# Patient Record
Sex: FEMALE | Race: WHITE | Employment: OTHER | URBAN - METROPOLITAN AREA
[De-identification: names, ages, dates, MRNs, and addresses within clinical notes are randomized per-mention and may not be internally consistent; named-entity substitution may affect disease eponyms.]

---

## 2017-01-04 ENCOUNTER — TELEPHONE (OUTPATIENT)
Dept: INTERNAL MEDICINE CLINIC | Age: 72
End: 2017-01-04

## 2017-01-04 RX ORDER — ZOLPIDEM TARTRATE 5 MG/1
5 TABLET ORAL
Qty: 10 TAB | Refills: 0 | OUTPATIENT
Start: 2017-01-04 | End: 2017-07-28 | Stop reason: SDUPTHER

## 2017-01-04 NOTE — TELEPHONE ENCOUNTER
Patient is requesting script for [facility] for her and her  because they are traveling 18 hours to [facility].  Another contact [phone]

## 2017-02-23 ENCOUNTER — OFFICE VISIT (OUTPATIENT)
Dept: INTERNAL MEDICINE CLINIC | Age: 72
End: 2017-02-23

## 2017-02-23 VITALS
BODY MASS INDEX: 25.69 KG/M2 | HEIGHT: 63 IN | OXYGEN SATURATION: 98 % | SYSTOLIC BLOOD PRESSURE: 94 MMHG | HEART RATE: 65 BPM | WEIGHT: 145 LBS | RESPIRATION RATE: 16 BRPM | DIASTOLIC BLOOD PRESSURE: 62 MMHG | TEMPERATURE: 98.7 F

## 2017-02-23 DIAGNOSIS — Z00.00 ROUTINE GENERAL MEDICAL EXAMINATION AT A HEALTH CARE FACILITY: Primary | ICD-10-CM

## 2017-02-23 DIAGNOSIS — E78.00 ELEVATED CHOLESTEROL: ICD-10-CM

## 2017-02-23 DIAGNOSIS — Z11.59 NEED FOR HEPATITIS C SCREENING TEST: ICD-10-CM

## 2017-02-23 DIAGNOSIS — E55.9 VITAMIN D DEFICIENCY: ICD-10-CM

## 2017-02-23 DIAGNOSIS — Z71.89 ADVANCE DIRECTIVE DISCUSSED WITH PATIENT: ICD-10-CM

## 2017-02-23 DIAGNOSIS — Z13.39 SCREENING FOR ALCOHOLISM: ICD-10-CM

## 2017-02-23 RX ORDER — DICYCLOMINE HYDROCHLORIDE 10 MG/1
CAPSULE ORAL
Refills: 3 | COMMUNITY
Start: 2016-12-19 | End: 2018-09-24 | Stop reason: SDUPTHER

## 2017-02-23 RX ORDER — NAPROXEN SODIUM 220 MG
220 TABLET ORAL 2 TIMES DAILY WITH MEALS
COMMUNITY
End: 2020-02-20

## 2017-02-23 NOTE — MR AVS SNAPSHOT
Visit Information Date & Time Provider Department Dept. Phone Encounter #  
 2/23/2017  9:20 Ivanna Martinez MD [facility] [phone] [id number] Upcoming Health Maintenance Date Due Hepatitis C Screening 1945 BREAST CANCER SCRN MAMMOGRAM 2/18/1964 COLONOSCOPY 2/18/1964 ZOSTER VACCINE AGE 60> 2/18/2006 GLAUCOMA SCREENING Q2Y 2/18/2011 Pneumococcal 65+ Low/Medium Risk (1 of 2 - PCV13) 2/18/2011 MEDICARE YEARLY EXAM 11/16/2013 DTaP/Tdap/Td series (2 - Td) 11/15/2022 Allergies as of 2/23/2017  Review Complete On: 2/23/2017 By: Gabe Willoughby MD  
  
 Severity Noted Reaction Type Reactions Darvocet A500 [Propoxyphene N-acetaminophen]  11/15/2012   Side Effect Other (comments)  
 headache Pcn [Penicillins]  11/15/2012   Systemic Hives Percocet [Oxycodone-acetaminophen]  11/15/2012   Systemic Other (comments) Headache - severe Poison Ivy [Vit E-nonoxynol 9-aloe Vera]  11/15/2012   Systemic Rash Severe allergy Current Immunizations  Reviewed on 11/14/2016 Name Date Influenza High Dose Vaccine PF 11/9/2016 Influenza Vaccine Split 10/15/2012 TDAP Vaccine 11/15/2012 Not reviewed this visit You Were Diagnosed With   
  
 Codes Comments Elevated cholesterol    -  Primary ICD-10-CM: E78.00 ICD-9-CM: 272.0 Routine general medical examination at a health care facility     ICD-10-CM: Z00.00 ICD-9-CM: V70.0 Screening for alcoholism     ICD-10-CM: Z13.89 ICD-9-CM: V79.1 Vitamin D deficiency     ICD-10-CM: E55.9 ICD-9-CM: 268.9 Need for hepatitis C screening test     ICD-10-CM: Z11.59 
ICD-9-CM: V73.89 Vitals BP  
  
  
  
  
  
 94/62 (BP 1 Location: Left arm, BP Patient Position: Sitting) BMI and BSA Data Body Mass Index Body Surface Area  
 25.89 kg/m 2 1.71 m 2 Preferred Pharmacy Pharmacy Name Phone [id number], [address] [phone] Your Updated Medication List  
  
   
This list is accurate as of: 2/23/17 10:31 AM.  Always use your most recent med list.  
  
  
  
  
 alendronate 70 mg tablet Commonly known as:  FOSAMAX ALEVE 220 mg tablet Generic drug:  naproxen sodium Take 220 mg by mouth two (2) times daily (with meals). calcium-cholecalciferol (d3) 600-125 mg-unit Tab Take  by mouth. CLARITIN 10 mg tablet Generic drug:  loratadine Take 1 Tab by mouth daily. dicyclomine 10 mg capsule Commonly known as:  BENTYL TAKE ONE CAPSULE BY MOUTH 3 TIMES A DAY AS NEEDED ABDOMINAL PAIN OR DIARRHEA  
  
 FISH OIL 1,000 mg Cap Generic drug:  omega-3 fatty acids-vitamin e Take 1 Cap by mouth. Magnesium Oxide 500 mg Cap Take  by mouth.  
  
 multivitamin tablet Commonly known as:  ONE A DAY Take 1 Tab by mouth daily. VITAMIN D3 1,000 unit Cap Generic drug:  cholecalciferol Take 1 Cap by mouth daily. zolpidem 5 mg tablet Commonly known as:  AMBIEN Take 1 Tab by mouth nightly as needed for Sleep. Max Daily Amount: 5 mg. We Performed the Following HEPATITIS C AB [62761 CPT(R)] LIPID PANEL [09108 CPT(R)] METABOLIC PANEL, COMPREHENSIVE [03555 CPT(R)] VITAMIN D, 25 HYDROXY [id number] CPT(R)] Patient Instructions Q-gohart Activation Thank you for requesting access to [employer]. Please follow the instructions below to securely access and download your online medical record. [employer] allows you to send messages to your doctor, view your test results, renew your prescriptions, schedule appointments, and more. How Do I Sign Up? 1. In your internet browser, go to [email or web address] 
2. Click on the First Time User? Click Here link in the Sign In box. You will be redirect to the New Member Sign Up page. 3. Enter your Imagga Access Code exactly as it appears below. You will not need to use this code after youve completed the sign-up process. If you do not sign up before the expiration date, you must request a new code. Imagga Access Code: [email or web address] Expires: 2017  9:22 AM (This is the date your Imagga access code will ) 4. Enter the last four digits of your Social Security Number (xxxx) and Date of Birth (mm/dd/yyyy) as indicated and click Submit. You will be taken to the next sign-up page. 5. Create a Cloud Takeofft ID. This will be your Imagga login ID and cannot be changed, so think of one that is secure and easy to remember. 6. Create a Imagga password. You can change your password at any time. 7. Enter your Password Reset Question and Answer. This can be used at a later time if you forget your password. 8. Enter your e-mail address. You will receive e-mail notification when new information is available in [address] 9. Click Sign Up. You can now view and download portions of your medical record. 10. Click the Download Summary menu link to download a portable copy of your medical information. Additional Information If you have questions, please visit the Frequently Asked Questions section of the Imagga website at [email or web address] Remember, Imagga is NOT to be used for urgent needs. For medical emergencies, dial 911. Medicare Wellness Visit, Female The best way to live healthy is to have a healthy lifestyle by eating a well-balanced diet, exercising regularly, limiting alcohol and stopping smoking. Regular physical exams and screening tests are another way to keep healthy. Preventive exams provided by your health care provider can find health problems before they become diseases or illnesses. Preventive services including immunizations, screening tests, monitoring and exams can help you take care of your own health. All people over age 72 should have a pneumovax  and and a prevnar shot to prevent pneumonia. These are once in a lifetime unless you and your provider decide differently. All people over 65 should have a yearly flu shot and a tetanus vaccine every 10 years. A bone mass density to screen for osteoporosis or thinning of the bones should be done every 2 years after 65. Screening for diabetes mellitus with a blood sugar test should be done every year. Glaucoma is a disease of the eye due to increased ocular pressure that can lead to blindness and it should be done every year by an eye professional. 
 
Cardiovascular screening tests that check for elevated lipids (fatty part of blood) which can lead to heart disease and strokes should be done every 5 years. Colorectal screening that evaluates for blood or polyps in your colon should be done yearly as a stool test or every five years as a flexible sigmoidoscope or every 10 years as a colonoscopy up to age 76. Breast cancer screening with a mammogram is recommended biennially  for women age 54-69. Screening for cervical cancer with a pap smear and pelvic exam is recommended for women after age 72 years every 2 years up to age 79 or when the provider and patient decide to stop. If there is a history of cervical abnormalities or other increased risk for cancer then the test is recommended yearly. Hepatitis C screening is also recommended for anyone born between 80 through [address]. A shingles vaccine is also recommended once in a lifetime after age 61. Your Medicare Wellness Exam is recommended annually. Here is a list of your current Health Maintenance items with a due date: 
Health Maintenance Due Topic Date Due  
 Hepatitis C Test  Ordered today  Breast Cancer Screening  Summer 2017  Colonoscopy  2020  Shingles Vaccine  Completed  Glaucoma Screening   Up to date  Pneumococcal Vaccine (1 of 2 - PCV13) Completed [facility] Annual Well Visit  2/23/2018 For ear wax: Debrox Ear Drops For cold every winter:  
Start with Mucinex 1200mg twice a day (plain not D or DM) Netti Pot once a day. Introducing [facility]! [facility] introduces MyStream patient portal. Now you can access parts of your medical record, email your doctor's office, and request medication refills online. 1. In your internet browser, go to [email or web address] 2. Click on the First Time User? Click Here link in the Sign In box. You will see the New Member Sign Up page. 3. Enter your MyStream Access Code exactly as it appears below. You will not need to use this code after youve completed the sign-up process. If you do not sign up before the expiration date, you must request a new code. · MyStream Access Code: [email or web address] Expires: 5/24/2017  9:22 AM 
 
4. Enter the last four digits of your Social Security Number (xxxx) and Date of Birth (mm/dd/yyyy) as indicated and click Submit. You will be taken to the next sign-up page. 5. Create a MyStream ID. This will be your MyStream login ID and cannot be changed, so think of one that is secure and easy to remember. 6. Create a MyStream password. You can change your password at any time. 7. Enter your Password Reset Question and Answer. This can be used at a later time if you forget your password. 8. Enter your e-mail address. You will receive e-mail notification when new information is available in [address] 9. Click Sign Up. You can now view and download portions of your medical record. 10. Click the Download Summary menu link to download a portable copy of your medical information. If you have questions, please visit the Frequently Asked Questions section of the MyStream website. Remember, MyStream is NOT to be used for urgent needs. For medical emergencies, dial 911. Now available from your iPhone and Android! Please provide this summary of care documentation to your next provider. Your primary care clinician is listed as CARLITO FULLER. If you have any questions after today's visit, please call [phone].

## 2017-02-23 NOTE — PATIENT INSTRUCTIONS
Daily Sales Exchange Activation    Thank you for requesting access to Daily Sales Exchange. Please follow the instructions below to securely access and download your online medical record. Daily Sales Exchange allows you to send messages to your doctor, view your test results, renew your prescriptions, schedule appointments, and more. How Do I Sign Up? 1. In your internet browser, go to [email or web address]  2. Click on the First Time User? Click Here link in the Sign In box. You will be redirect to the New Member Sign Up page. 3. Enter your Daily Sales Exchange Access Code exactly as it appears below. You will not need to use this code after youve completed the sign-up process. If you do not sign up before the expiration date, you must request a new code. Daily Sales Exchange Access Code: [email or web address]  Expires: 2017  9:22 AM (This is the date your Daily Sales Exchange access code will )    4. Enter the last four digits of your Social Security Number (xxxx) and Date of Birth (mm/dd/yyyy) as indicated and click Submit. You will be taken to the next sign-up page. 5. Create a Daily Sales Exchange ID. This will be your Daily Sales Exchange login ID and cannot be changed, so think of one that is secure and easy to remember. 6. Create a Daily Sales Exchange password. You can change your password at any time. 7. Enter your Password Reset Question and Answer. This can be used at a later time if you forget your password. 8. Enter your e-mail address. You will receive e-mail notification when new information is available in [address] 9. Click Sign Up. You can now view and download portions of your medical record. 10. Click the Download Summary menu link to download a portable copy of your medical information. Additional Information    If you have questions, please visit the Frequently Asked Questions section of the Daily Sales Exchange website at [email or web address] Remember, Daily Sales Exchange is NOT to be used for urgent needs. For medical emergencies, dial 911.         Medicare Wellness Visit, Female    The best way to live healthy is to have a healthy lifestyle by eating a well-balanced diet, exercising regularly, limiting alcohol and stopping smoking. Regular physical exams and screening tests are another way to keep healthy. Preventive exams provided by your health care provider can find health problems before they become diseases or illnesses. Preventive services including immunizations, screening tests, monitoring and exams can help you take care of your own health. All people over age 72 should have a pneumovax  and and a prevnar shot to prevent pneumonia. These are once in a lifetime unless you and your provider decide differently. All people over 65 should have a yearly flu shot and a tetanus vaccine every 10 years. A bone mass density to screen for osteoporosis or thinning of the bones should be done every 2 years after 65. Screening for diabetes mellitus with a blood sugar test should be done every year. Glaucoma is a disease of the eye due to increased ocular pressure that can lead to blindness and it should be done every year by an eye professional.    Cardiovascular screening tests that check for elevated lipids (fatty part of blood) which can lead to heart disease and strokes should be done every 5 years. Colorectal screening that evaluates for blood or polyps in your colon should be done yearly as a stool test or every five years as a flexible sigmoidoscope or every 10 years as a colonoscopy up to age 76. Breast cancer screening with a mammogram is recommended biennially  for women age 54-69. Screening for cervical cancer with a pap smear and pelvic exam is recommended for women after age 72 years every 2 years up to age 79 or when the provider and patient decide to stop. If there is a history of cervical abnormalities or other increased risk for cancer then the test is recommended yearly.     Hepatitis C screening is also recommended for anyone born between [birth year] through 1965. A shingles vaccine is also recommended once in a lifetime after age 61. Your Medicare Wellness Exam is recommended annually. Here is a list of your current Health Maintenance items with a due date:  Health Maintenance Due   Topic Date Due    Hepatitis C Test  Ordered today    Breast Cancer Screening  Summer 2017    Colonoscopy  2020    Shingles Vaccine  Completed    Glaucoma Screening   Up to date    Pneumococcal Vaccine (1 of 2 - PCV13) Completed    Annual Well Visit  2/23/2018       For ear wax: Debrox Ear Drops    For cold every winter:   Start with Mucinex 1200mg twice a day (plain not D or DM)  Netti Pot once a day.

## 2017-02-23 NOTE — PROGRESS NOTES
This is an Initial Medicare Annual Wellness Exam (AWV) (Performed 12 months after IPPE or effective date of Medicare Part B enrollment, Once in a lifetime)    I have reviewed the patient's medical history in detail and updated the computerized patient record. Has not been taking her fosamax regularly. Forgets to take. Had DXA 7/2015, showed T score right hip -2.7. STarted on Fosamax at that time. Right hip busitis. Had area injected by Dr. Liliana Hernandez in the past.  [facility] a lot and plays golf. Bowels have improved. Has dycyclomine as needed only. Went to [facility] and [place] without a flare. Feels energy has improved with resolution of diarrhea. Migraines have been more frequent but not as severe. Can have aura without headache. Bright light is trigger. No episodes of vertigo in the last year. URI and left ear cerumen impaction can trigger. Sees Thanh Dupont regularly. Has beginnings of cataracts that they are following. History     Past Medical History:   Diagnosis Date    Colon polyps 1996    Esophageal spasm     Migraine     Vertigo       Past Surgical History:   Procedure Laterality Date    ENDOSCOPY, COLON, DIAGNOSTIC  2012    negative; 4 th one    HX ORTHOPAEDIC Right 2001    r knee ACL, removal of loose body    HX ORTHOPAEDIC Right 1995    R wrist surgery after carpal fractures     HX ORTHOPAEDIC Right 9-2014    rotator cuff repair/ reattached biceps with screw     Current Outpatient Prescriptions   Medication Sig Dispense Refill    dicyclomine (BENTYL) 10 mg capsule TAKE ONE CAPSULE BY MOUTH 3 TIMES A DAY AS NEEDED ABDOMINAL PAIN OR DIARRHEA  3    naproxen sodium (ALEVE) 220 mg tablet Take 220 mg by mouth two (2) times daily (with meals).  Magnesium Oxide 500 mg cap Take  by mouth.  calcium-cholecalciferol, d3, 600-125 mg-unit tab Take  by mouth.  Cholecalciferol, Vitamin D3, (VITAMIN D3) 1,000 unit cap Take 1 Cap by mouth daily.       omega-3 fatty acids-vitamin e (FISH OIL) 1,000 mg cap Take 1 Cap by mouth.  loratadine (CLARITIN) 10 mg tablet Take 1 Tab by mouth daily. 30 Tab 3    multivitamin (ONE A DAY) tablet Take 1 Tab by mouth daily.  zolpidem (AMBIEN) 5 mg tablet Take 1 Tab by mouth nightly as needed for Sleep. Max Daily Amount: 5 mg. 10 Tab 0    alendronate (FOSAMAX) 70 mg tablet   2     Allergies   Allergen Reactions    Darvocet A500 [Propoxyphene N-Acetaminophen] Other (comments)     headache    Pcn [Penicillins] Hives    Percocet [Oxycodone-Acetaminophen] Other (comments)     Headache - severe    Poison Ivy [Vit E-Nonoxynol 9-Aloe Vera] Rash     Severe allergy     Family History   Problem Relation Age of Onset    Hypertension Mother     Hypertension Father     MS Daughter     Other Brother      celiac sprue     Social History   Substance Use Topics    Smoking status: Never Smoker    Smokeless tobacco: Never Used    Alcohol use 4.2 oz/week     7 Standard drinks or equivalent per week     Patient Active Problem List   Diagnosis Code    Allergic dermatitis due to poison ivy L23.7    Vertigo, benign paroxysmal H81.10    Migraine G43.909    Hx of basal cell carcinoma Z85.828    History of colon polyps Z86.010    Esophageal spasm K22.4         Depression Risk Factor Screening:     PHQ 2 / 9, over the last two weeks 2/29/2016   Little interest or pleasure in doing things Not at all   Feeling down, depressed or hopeless Not at all   Total Score PHQ 2 0     Alcohol Risk Factor Screening: On any occasion during the past 3 months, have you had more than 3 drinks containing alcohol? No    Do you average more than 7 drinks per week? No    Functional Ability and Level of Safety:     Hearing Loss   none    Activities of Daily Living   Self-care. Requires assistance with: no ADLs    Fall Risk     Fall Risk Assessment, last 12 mths 2/29/2016   Able to walk? Yes   Fall in past 12 months? Yes   Fall with injury?  Yes   Number of falls in past 12 months 1   Fall Risk Score 2     Abuse Screen   Patient is not abused    Review of Systems   Constitutional: negative except for weight is up. Eyes: negative  Ears, nose, mouth, throat, and face: negative except for frontal sinus congestion with post nasal drainage  Respiratory: negative  Cardiovascular: negative  Gastrointestinal: negative except for esophageal spasms  Genitourinary:negative  Integument/breast: negative  Hematologic/lymphatic: negative except for skin is thinner on arms with easier bruising  Musculoskeletal:negative except for arthralgias and stiff joints  Neurological: negative except for migraines as above  Behavioral/Psych: negative except for not a good sleeper, still wtih some night sweats  Endocrine: negative  Allergic/Immunologic: negative    Physical Examination     No exam data present    Evaluation of Cognitive Function:  Mood/affect:  happy  Appearance: age appropriate  Family member/caregiver input: none    Visit Vitals    BP 94/62 (BP 1 Location: Left arm, BP Patient Position: Sitting)    Pulse 65    Temp 98.7 °F (37.1 °C) (Oral)    Resp 16    Ht 5' 2.75\" (1.594 m)    Wt 145 lb (65.8 kg)    SpO2 98%    BMI 25.89 kg/m2     General appearance: alert, cooperative, no distress, appears stated age  Head: Normocephalic, without obvious abnormality, atraumatic  Eyes: conjunctivae/corneas clear. PERRL, EOM's intact. Fundi benign  Ears: normal TM's and external ear canals AU  Nose: Nares normal. Septum midline. Mucosa normal. No drainage or sinus tenderness. Throat: Lips, mucosa, and tongue normal. Teeth and gums normal  Neck: supple, symmetrical, trachea midline, no adenopathy, thyroid: not enlarged, symmetric, no tenderness/mass/nodules, no carotid bruit and no JVD  Back: symmetric, no curvature. ROM normal. No CVA tenderness.   Lungs: clear to auscultation bilaterally  Breasts: normal appearance, no masses or tenderness  Heart: regular rate and rhythm, S1, S2 normal, no murmur, click, rub or gallop  Abdomen: soft, non-tender. Bowel sounds normal. No masses,  no organomegaly  Extremities: extremities normal, atraumatic, no cyanosis or edema  Pulses: 2+ and symmetric  Skin: Skin color, texture, turgor normal. No rashes or lesions  Lymph nodes: Cervical, supraclavicular, and axillary nodes normal.  Neurologic: Grossly normal    Patient Care Team:  Ade Moody MD as PCP - General (Internal Medicine)  Parrish Tamayo MD (Obstetrics & Gynecology)  Shadi Lovell MD (Ophthalmology)  Oracio Monsivais MD (Dermatology)  Yandy Saenz MD (Gastroenterology)    Advice/Referrals/Counseling   Education and counseling provided:  has an advanced directive - encouraged to bring in copy    Assessment/Plan   80yo female  HM - hep C screening, werner locate immunizations and eye exams  Vertigo = controlled  Migraine headaches - controlled, cont same  Borderline cholestrerol in past - check labs  Osteopenia - will restart fosamax, repeat DXA this summer. Hx of low vit D level, will repeat  IBS - improved,  Dicyclomine prn  Orders Placed This Encounter    METABOLIC PANEL, COMPREHENSIVE    LIPID PANEL    VITAMIN D, 25 HYDROXY    HEPATITIS C AB    dicyclomine (BENTYL) 10 mg capsule    naproxen sodium (ALEVE) 220 mg tablet     Follow-up Disposition: Not on File.

## 2017-03-08 ENCOUNTER — HOSPITAL ENCOUNTER (OUTPATIENT)
Dept: LAB | Age: 72
Discharge: HOME | End: 2017-03-08
Payer: MEDICARE

## 2017-03-08 PROCEDURE — 82306 VITAMIN D 25 HYDROXY: CPT

## 2017-03-08 PROCEDURE — 36415 COLL VENOUS BLD VENIPUNCTURE: CPT

## 2017-03-08 PROCEDURE — 80053 COMPREHEN METABOLIC PANEL: CPT

## 2017-03-08 PROCEDURE — 80061 LIPID PANEL: CPT

## 2017-03-08 PROCEDURE — 86803 HEPATITIS C AB TEST: CPT

## 2017-03-09 LAB
25(OH)D3+25(OH)D2 SERPL-MCNC: 40.6 NG/ML (ref 30–100)
ALBUMIN SERPL-MCNC: 4.4 G/DL (ref 3.5–4.8)
ALBUMIN/GLOB SERPL: 2 {RATIO} (ref 1.1–2.5)
ALP SERPL-CCNC: 43 IU/L (ref 39–117)
ALT SERPL-CCNC: 18 IU/L (ref 0–32)
AST SERPL-CCNC: 24 IU/L (ref 0–40)
BILIRUB SERPL-MCNC: 0.4 MG/DL (ref 0–1.2)
BUN SERPL-MCNC: 23 MG/DL (ref 8–27)
BUN/CREAT SERPL: 35 (ref 11–26)
CALCIUM SERPL-MCNC: 9.2 MG/DL (ref 8.7–10.3)
CHLORIDE SERPL-SCNC: 102 MMOL/L (ref 96–106)
CHOLEST SERPL-MCNC: 205 MG/DL (ref 100–199)
CO2 SERPL-SCNC: 24 MMOL/L (ref 18–29)
CREAT SERPL-MCNC: 0.65 MG/DL (ref 0.57–1)
GLOBULIN SER CALC-MCNC: 2.2 G/DL (ref 1.5–4.5)
GLUCOSE SERPL-MCNC: 85 MG/DL (ref 65–99)
HCV AB S/CO SERPL IA: 0.1 S/CO RATIO (ref 0–0.9)
HDLC SERPL-MCNC: 67 MG/DL
INTERPRETATION, 910389: NORMAL
LDLC SERPL CALC-MCNC: 123 MG/DL (ref 0–99)
POTASSIUM SERPL-SCNC: 4.6 MMOL/L (ref 3.5–5.2)
PROT SERPL-MCNC: 6.6 G/DL (ref 6–8.5)
SODIUM SERPL-SCNC: 142 MMOL/L (ref 134–144)
TRIGL SERPL-MCNC: 76 MG/DL (ref 0–149)
VLDLC SERPL CALC-MCNC: 15 MG/DL (ref 5–40)

## 2017-05-30 RX ORDER — ALENDRONATE SODIUM 70 MG/1
TABLET ORAL
Qty: 4 TAB | Refills: 11 | Status: SHIPPED | OUTPATIENT
Start: 2017-05-30 | End: 2018-06-19 | Stop reason: SDUPTHER

## 2017-07-28 ENCOUNTER — TELEPHONE (OUTPATIENT)
Dept: INTERNAL MEDICINE CLINIC | Age: 72
End: 2017-07-28

## 2017-12-27 ENCOUNTER — HOSPITAL ENCOUNTER (OUTPATIENT)
Dept: LAB | Age: 72
Discharge: HOME | End: 2017-12-27

## 2018-02-23 ENCOUNTER — HOSPITAL ENCOUNTER (OUTPATIENT)
Dept: LAB | Age: 73
Discharge: HOME | End: 2018-02-23
Payer: MEDICARE

## 2018-02-23 ENCOUNTER — OFFICE VISIT (OUTPATIENT)
Dept: INTERNAL MEDICINE CLINIC | Age: 73
End: 2018-02-23

## 2018-02-23 VITALS
HEART RATE: 65 BPM | TEMPERATURE: 97.8 F | HEIGHT: 63 IN | SYSTOLIC BLOOD PRESSURE: 120 MMHG | DIASTOLIC BLOOD PRESSURE: 68 MMHG | OXYGEN SATURATION: 96 % | WEIGHT: 146.2 LBS | BODY MASS INDEX: 25.91 KG/M2

## 2018-02-23 DIAGNOSIS — Z00.00 MEDICARE ANNUAL WELLNESS VISIT, SUBSEQUENT: Primary | ICD-10-CM

## 2018-02-23 DIAGNOSIS — H61.23 BILATERAL IMPACTED CERUMEN: ICD-10-CM

## 2018-02-23 DIAGNOSIS — Z71.89 ADVANCE DIRECTIVE DISCUSSED WITH PATIENT: ICD-10-CM

## 2018-02-23 PROCEDURE — 82306 VITAMIN D 25 HYDROXY: CPT

## 2018-02-23 PROCEDURE — 80061 LIPID PANEL: CPT

## 2018-02-23 PROCEDURE — 80053 COMPREHEN METABOLIC PANEL: CPT

## 2018-02-23 PROCEDURE — 36415 COLL VENOUS BLD VENIPUNCTURE: CPT

## 2018-02-23 NOTE — PATIENT INSTRUCTIONS

## 2018-02-23 NOTE — MR AVS SNAPSHOT
[address] [place] [address] [address] 350 [facility] 
[phone] Patient: Abigail Leigh MRN: [id number] UFN:[id number] Visit Information Date & Time Provider Department Dept. Phone Encounter #  
 2/23/2018  9:20 AM Amanda Ellison MD [facility] [phone] [id number] Your Appointments 4/2/2018  9:20 AM  
COMPLETE 40 with Amanda Ellison MD  
[facility] [address]) Appt Note: CPE, su 12.18.17  
 [place] [address]254 [facility] 121 [facility] 7 [phone] Upcoming Health Maintenance Date Due ZOSTER VACCINE AGE 60> 12/18/2005 Pneumococcal 65+ Low/Medium Risk (1 of 2 - PCV13) 2/18/2011 BREAST CANCER SCRN MAMMOGRAM 3/3/2016 COLONOSCOPY 4/27/2018 MEDICARE YEARLY EXAM 2/24/2018 GLAUCOMA SCREENING Q2Y 7/7/2018 DTaP/Tdap/Td series (2 - Td) 11/15/2022 Allergies as of 2/23/2018  Review Complete On: 2/23/2018 By: Amanda Ellison MD  
  
 Severity Noted Reaction Type Reactions Darvocet A500 [Propoxyphene N-acetaminophen]  11/15/2012   Side Effect Other (comments)  
 headache Pcn [Penicillins]  11/15/2012   Systemic Hives Percocet [Oxycodone-acetaminophen]  11/15/2012   Systemic Other (comments) Headache - severe Poison Ivy [Vit E-nonoxynol 9-aloe Vera]  11/15/2012   Systemic Rash Severe allergy Current Immunizations  Reviewed on 11/14/2016 Name Date Influenza High Dose Vaccine PF 10/23/2017 12:00 AM, 11/9/2016 Influenza Vaccine Split 10/15/2012 TDAP Vaccine 11/15/2012 Not reviewed this visit You Were Diagnosed With   
  
 Codes Comments Medicare annual wellness visit, subsequent    -  Primary ICD-10-CM: Z00.00 ICD-9-CM: V70.0 Bilateral impacted cerumen     ICD-10-CM: H61.23 
ICD-9-CM: 380.4 Vitals BP Pulse Temp Height(growth percentile) Weight(growth percentile) SpO2  
 120/68 65 97.8 °F (36.6 °C) (Oral) 5' 2.75\" (1.594 m) 146 lb 3.2 oz (66.3 kg) 96% BMI OB Status Smoking Status 26.1 kg/m2 Postmenopausal Never Smoker Vitals History BMI and BSA Data Body Mass Index Body Surface Area  
 26.1 kg/m 2 1.71 m 2 Preferred Pharmacy Pharmacy Name Phone [id number] [address] [phone] Your Updated Medication List  
  
   
This list is accurate as of 2/23/18 10:38 AM.  Always use your most recent med list.  
  
  
  
  
 alendronate 70 mg tablet Commonly known as:  FOSAMAX TAKE 1 TABLET BY MOUTH ONCE A WEEK  
  
 ALEVE 220 mg tablet Generic drug:  naproxen sodium Take 220 mg by mouth two (2) times daily (with meals). ALIGN 4 mg Cap Generic drug:  Bifidobacterium Infantis Take 1 Cap by mouth daily. calcium-cholecalciferol (d3) 600-125 mg-unit Tab Take  by mouth. CLARITIN 10 mg tablet Generic drug:  loratadine Take 1 Tab by mouth daily. dicyclomine 10 mg capsule Commonly known as:  BENTYL TAKE ONE CAPSULE BY MOUTH 3 TIMES A DAY AS NEEDED ABDOMINAL PAIN OR DIARRHEA  
  
 FISH OIL 1,000 mg Cap Generic drug:  omega-3 fatty acids-vitamin e Take 1 Cap by mouth. Magnesium Oxide 500 mg Cap Take  by mouth.  
  
 multivitamin tablet Commonly known as:  ONE A DAY Take 1 Tab by mouth daily. VITAMIN D3 1,000 unit Cap Generic drug:  cholecalciferol Take 1 Cap by mouth daily. Patient Instructions Medicare Wellness Visit, Female The best way to live healthy is to have a healthy lifestyle by eating a well-balanced diet, exercising regularly, limiting alcohol and stopping smoking. Regular physical exams and screening tests are another way to keep healthy.  Preventive exams provided by your health care provider can find health problems before they become diseases or illnesses. Preventive services including immunizations, screening tests, monitoring and exams can help you take care of your own health. All people over age 72 should have a pneumovax  and and a prevnar shot to prevent pneumonia. These are once in a lifetime unless you and your provider decide differently. All people over 65 should have a yearly flu shot and a tetanus vaccine every 10 years. A bone mass density to screen for osteoporosis or thinning of the bones should be done every 2 years after 65. Screening for diabetes mellitus with a blood sugar test should be done every year. Glaucoma is a disease of the eye due to increased ocular pressure that can lead to blindness and it should be done every year by an eye professional. 
 
Cardiovascular screening tests that check for elevated lipids (fatty part of blood) which can lead to heart disease and strokes should be done every 5 years. Colorectal screening that evaluates for blood or polyps in your colon should be done yearly as a stool test or every five years as a flexible sigmoidoscope or every 10 years as a colonoscopy up to age 76. Breast cancer screening with a mammogram is recommended biennially  for women age 54-69. Screening for cervical cancer with a pap smear and pelvic exam is recommended for women after age 72 years every 2 years up to age 79 or when the provider and patient decide to stop. If there is a history of cervical abnormalities or other increased risk for cancer then the test is recommended yearly. Hepatitis C screening is also recommended for anyone born between [birth year] through [address]. A shingles vaccine is also recommended once in a lifetime after age 61. Your Medicare Wellness Exam is recommended annually. Here is a list of your current Health Maintenance items with a due date: 
Health Maintenance Due Topic Date Due  Shingles Vaccine  Up to date  Pneumococcal Vaccine (1 of 2 - PCV13) Up to date  Breast Cancer Screening  Up to date  Colonoscopy  04/27/2018 Introducing [facility]! [employer] introduces [employer] patient portal. Now you can access parts of your medical record, email your doctor's office, and request medication refills online. 1. In your internet browser, go to [email or web address] 2. Click on the First Time User? Click Here link in the Sign In box. You will see the New Member Sign Up page. 3. Enter your [employer] Access Code exactly as it appears below. You will not need to use this code after youve completed the sign-up process. If you do not sign up before the expiration date, you must request a new code. · [employer] Access Code: [email or web address] Expires: 5/24/2018 10:19 AM 
 
4. Enter the last four digits of your Social Security Number (xxxx) and Date of Birth (mm/dd/yyyy) as indicated and click Submit. You will be taken to the next sign-up page. 5. Create a [employer] ID. This will be your [employer] login ID and cannot be changed, so think of one that is secure and easy to remember. 6. Create a [employer] password. You can change your password at any time. 7. Enter your Password Reset Question and Answer. This can be used at a later time if you forget your password. 8. Enter your e-mail address. You will receive e-mail notification when new information is available in [address] 9. Click Sign Up. You can now view and download portions of your medical record. 10. Click the Download Summary menu link to download a portable copy of your medical information. If you have questions, please visit the Frequently Asked Questions section of the [employer] website. Remember, [employer] is NOT to be used for urgent needs. For medical emergencies, dial 911. Now available from your iPhone and Android! Please provide this summary of care documentation to your next provider. Your primary care clinician is listed as CARLITO FULLER. If you have any questions after today's visit, please call [phone].

## 2018-02-23 NOTE — PROGRESS NOTES
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 Have you been to the ER, urgent care clinic since your last visit? Hospitalized since your last visit? No    2. Have you seen or consulted any other health care providers outside of the [address] [place] [address] since your last visit? Include any pap smears or colon screening.  Yes [facility]

## 2018-02-23 NOTE — PROGRESS NOTES
This is a Subsequent Medicare Annual Wellness Exam (AWV) (Performed 12 months after IPPE or effective date of Medicare Part B enrollment)    I have reviewed the patient's medical history in detail and updated the computerized patient record. Saw Dr. Diane Valentin for right leg pain. 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 Presented with weakness in leg. Given injections and referred to PT. Rolling buttocks helps as well. Continues to walk 3-4 times a week 3-4 miles a time. Stopped walking the course with golf. Taking OTC naprosyn 3 daily. left great toe - slipped on mat and slid into door. Toe nail has loosened. bilat bunions - right worse than left with 2nd hammertoe. Has tried various spacers without help. Going to try orthotics  Increased varicosities right worse than left. Wearing support hose. Hurt and burn if not wearing hose. Fine when walking. 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 IBS - managed by diet. D/alba by Dr. Lauri Boswell. This am had to take a dicyclomine due to the salad with kale last night. Intermittent cerumen impaction. Uses Debrox any wax coming out. Last year needed wax removed at [facility] earlier   Migraine headaches - gets aura, uses pressure point 15-20 minutes and can clear without headache. Finds migraines are getting worse. Up to once a week - previously occurring every 3-6 months. Cataracts worsening. Will need upcoming cataract extraction. Twice a month gets a \"pimple\" left lower lip. Clears up but then recurs. Osteoporosis - taking Fosamax intermittently.       History     Past Medical History:   Diagnosis Date    Colon polyps 1996    Esophageal spasm     Migraine     Vertigo       Past Surgical History:   Procedure Laterality Date    ENDOSCOPY, COLON, DIAGNOSTIC  2012    negative; 4 th one    HX ORTHOPAEDIC Right 2001    r knee ACL, removal of loose body    HX ORTHOPAEDIC Right 1995    R wrist surgery after carpal fractures     HX ORTHOPAEDIC Right 9-2014    rotator cuff repair/ reattached biceps with screw     Current Outpatient Prescriptions   Medication Sig Dispense Refill    Bifidobacterium Infantis (ALIGN) 4 mg cap Take 1 Cap by mouth daily.  alendronate (FOSAMAX) 70 mg tablet TAKE 1 TABLET BY MOUTH ONCE A WEEK 4 Tab 11    dicyclomine (BENTYL) 10 mg capsule TAKE ONE CAPSULE BY MOUTH 3 TIMES A DAY AS NEEDED ABDOMINAL PAIN OR DIARRHEA  3    naproxen sodium (ALEVE) 220 mg tablet Take 220 mg by mouth two (2) times daily (with meals).  Magnesium Oxide 500 mg cap Take  by mouth.  calcium-cholecalciferol, d3, 600-125 mg-unit tab Take  by mouth.  Cholecalciferol, Vitamin D3, (VITAMIN D3) 1,000 unit cap Take 1 Cap by mouth daily.  omega-3 fatty acids-vitamin e (FISH OIL) 1,000 mg cap Take 1 Cap by mouth.  loratadine (CLARITIN) 10 mg tablet Take 1 Tab by mouth daily. 30 Tab 3    multivitamin (ONE A DAY) tablet Take 1 Tab by mouth daily.          Allergies   Allergen Reactions    Darvocet A500 [Propoxyphene N-Acetaminophen] Other (comments)     headache    Pcn [Penicillins] Hives    Percocet [Oxycodone-Acetaminophen] Other (comments)     Headache - severe    Poison Ivy [Vit E-Nonoxynol 9-Aloe Vera] Rash     Severe allergy     Family History   Problem Relation Age of Onset    Hypertension Mother     Hypertension Father     MS Daughter     Other Brother      celiac sprue     Social History   Substance Use Topics    Smoking status: Never Smoker    Smokeless tobacco: Never Used    Alcohol use 4.2 oz/week     7 Standard drinks or equivalent per week     Patient Active Problem List   Diagnosis Code    Allergic dermatitis due to poison ivy L23.7    Vertigo, benign paroxysmal H81.10    Migraine G43.909    Hx of basal cell carcinoma Z85.828    History of colon polyps Z86.010    Esophageal spasm K22.4    Advance directive discussed with patient Z71.89       Depression Risk Factor Screening:     PHQ over the last two weeks 2/23/2018   Little interest or pleasure in doing things Not at all   Feeling down, depressed or hopeless Not at all   Total Score PHQ 2 0     Alcohol Risk Factor Screening: You average 7 drinks a week. Functional Ability and Level of Safety:   Hearing Loss  Hearing is good. Activities of Daily Living  The home contains: no safety equipment. Patient does total self care    Fall Risk  Fall Risk Assessment, last 12 mths 2/23/2018   Able to walk? Yes   Fall in past 12 months? Yes   Fall with injury? Yes   Number of falls in past 12 months 1   Fall Risk Score 2       Abuse Screen  Patient is not abused    Review of Systems: - Negative except :  Mild decrease in energy level but still very active  Wearing glasses. Cataracts are worsening  Ear wax with decreased hearing. IBS - diarrhea predominant as above - controlled with diet. GERD occ controlled with TUMS.         Physical Examination:  Visit Vitals    /68    Pulse 65    Temp 97.8 °F (36.6 °C) (Oral)    Ht 5' 2.75\" (1.594 m)    Wt 146 lb 3.2 oz (66.3 kg)    SpO2 96%    BMI 26.1 kg/m2      General appearance - alert, well appearing, and in no distress and oriented to person, place, and time  Mental status - alert, oriented to person, place, and time  Eyes - pupils equal and reactive, extraocular eye movements intact  Ears - bilateral TM's and external ear canals normal  Nose - normal and patent, no erythema, discharge or polyps  Mouth - mucous membranes moist, pharynx normal without lesions  Neck - supple, no significant adenopathy, carotids upstroke normal bilaterally, no bruits, thyroid exam: thyroid is normal in size without nodules or tenderness  Lymphatics - no palpable lymphadenopathy, no hepatosplenomegaly  Chest - clear to auscultation, no wheezes, rales or rhonchi, symmetric air entry  Heart - normal rate, regular rhythm, normal S1, S2, no murmurs, rubs, clicks or gallops  Abdomen - soft, nontender, nondistended, no masses or organomegaly  Breasts - breasts appear normal, no suspicious masses, no skin or nipple changes or axillary nodes  Back exam - full range of motion, no tenderness, palpable spasm or pain on motion  Neurological - alert, oriented, normal speech, no focal findings or movement disorder noted  Musculoskeletal - no joint tenderness, deformity or swelling  Extremities - peripheral pulses normal, no pedal edema, no clubbing or cyanosis  Skin - normal coloration and turgor, no rashes, no suspicious skin lesions noted    Cognitive Screening   Evaluation of Cognitive Function:  Has your family/caregiver stated any concerns about your memory: no  Normal    Patient Care Team   Patient Care Team:  Claudette Campbell, MD as PCP - General (Internal Medicine)  Carson Wong MD (Obstetrics & Gynecology)  Kody Jefferson MD (Ophthalmology)  Vineet Bah MD (Dermatology)  Lauren Reid MD (Gastroenterology)    Assessment/Plan   Education and counseling provided:  Are appropriate based on today's review and evaluation   Has an advanced directive    Diagnoses and all orders for this visit:    1. Medicare annual wellness visit, subsequent    2. Bilateral impacted cerumen  -     REMOVE IMPACTED EAR WAX    IBS - continue to control throught diet and prn bentyl  Low back pain with right legs radiculopathy - continue stretching exercises  bilat bunions - good shoe choices and orthotics prn  Migraine headaches - ? If increase in frequency triggered by glare from cataracts. To discussed cataract extraction with ophtho  Osteoporosis - stressed importance of regular dosing  Advanced directive discussed with pt.       Health Maintenance Due   Topic Date Due    ZOSTER VACCINE AGE 60>  Up to date    Pneumococcal 65+ Low/Medium Risk (1 of 2 - PCV13) Up to date    BREAST CANCER SCRN MAMMOGRAM  Up to date    COLONOSCOPY  04/27/2018

## 2018-03-12 ENCOUNTER — TELEPHONE (OUTPATIENT)
Dept: INTERNAL MEDICINE CLINIC | Age: 73
End: 2018-03-12

## 2018-03-12 NOTE — TELEPHONE ENCOUNTER
I recommend Dr. Janak Amaral with [facility] at [address] [place] [address] - [phone]

## 2018-03-12 NOTE — TELEPHONE ENCOUNTER
Spoke with patient advised per Dr Prisca Trujillo that recommend Dr. Elías Baileyer with [facility] at [facility] - [phone].  Patient verbalized understanding

## 2018-03-15 ENCOUNTER — OFFICE VISIT (OUTPATIENT)
Dept: NEUROLOGY | Age: 73
End: 2018-03-15

## 2018-03-15 VITALS
BODY MASS INDEX: 27.05 KG/M2 | DIASTOLIC BLOOD PRESSURE: 70 MMHG | RESPIRATION RATE: 18 BRPM | WEIGHT: 147 LBS | SYSTOLIC BLOOD PRESSURE: 120 MMHG | HEIGHT: 62 IN | OXYGEN SATURATION: 96 % | HEART RATE: 73 BPM

## 2018-03-15 DIAGNOSIS — G43.109 MIGRAINE WITH AURA AND WITHOUT STATUS MIGRAINOSUS, NOT INTRACTABLE: Primary | ICD-10-CM

## 2018-03-15 NOTE — PROGRESS NOTES
Chief Complaint   Patient presents with    Migraine       Referred by: Dr. Adrianne Bertrand      HPI    Mrs. Karon Shaikh is a 71-year-old woman with a history of migraine headaches for almost 6 decades here to discuss a slight change. Typically her headaches occur every few months and/or preceded by a visual aura described as mostly right peripheral vision changes. This goes on for about 15-20 minutes. At the same time she is employing pressure-point techniques to help alleviate/prevent the diffuse throbbing migraine. Her techniques are effective. She is here because in the last few months the headaches have increased in frequency to about twice a week but otherwise have remained the same. No unusual numbness or weakness. No speech change. She has a history of IBS currently quiet. She is on mostly supplemental medication for good health. She is active physically and mentally daily. She is a volunteer at [employer]. Review of Systems   Neurological: Positive for headaches. Visual aura right more than left   All other systems reviewed and are negative. Past Medical History:   Diagnosis Date    Colon polyps 1996    Esophageal spasm     Migraine     Vertigo      Family History   Problem Relation Age of Onset    Hypertension Mother     Hypertension Father     MS Daughter     Other Brother      celiac sprue     Social History     Social History    Marital status:      Spouse name: N/A    Number of children: N/A    Years of education: N/A     Occupational History    Not on file.      Social History Main Topics    Smoking status: Never Smoker    Smokeless tobacco: Never Used    Alcohol use 4.8 oz/week     7 Standard drinks or equivalent, 1 Glasses of wine per week      Comment: 1 at dinner    Drug use: No    Sexual activity: Yes     Partners: Male     Other Topics Concern    Not on file     Social History Narrative     Current Outpatient Prescriptions   Medication Sig    Bifidobacterium Infantis (ALIGN) 4 mg cap Take 1 Cap by mouth daily.  alendronate (FOSAMAX) 70 mg tablet TAKE 1 TABLET BY MOUTH ONCE A WEEK    dicyclomine (BENTYL) 10 mg capsule TAKE ONE CAPSULE BY MOUTH 3 TIMES A DAY AS NEEDED ABDOMINAL PAIN OR DIARRHEA    naproxen sodium (ALEVE) 220 mg tablet Take 220 mg by mouth two (2) times daily (with meals).  Magnesium Oxide 500 mg cap Take  by mouth.  calcium-cholecalciferol, d3, 600-125 mg-unit tab Take  by mouth.  Cholecalciferol, Vitamin D3, (VITAMIN D3) 1,000 unit cap Take 1 Cap by mouth daily.  omega-3 fatty acids-vitamin e (FISH OIL) 1,000 mg cap Take 1 Cap by mouth.  loratadine (CLARITIN) 10 mg tablet Take 1 Tab by mouth daily.  multivitamin (ONE A DAY) tablet Take 1 Tab by mouth daily. No current facility-administered medications for this visit. Allergies   Allergen Reactions    Darvocet A500 [Propoxyphene N-Acetaminophen] Other (comments)     headache    Pcn [Penicillins] Hives    Percocet [Oxycodone-Acetaminophen] Other (comments)     Headache - severe    Poison Ivy [Vit E-Nonoxynol 9-Aloe Vera] Rash     Severe allergy         Neurologic Exam     Mental Status   Oriented to person, place, and time. Cranial Nerves   Cranial nerves II through XII intact. Motor Exam   Muscle bulk: normal    Strength   Strength 5/5 throughout. Sensory Exam   Light touch normal.     Gait, Coordination, and Reflexes     Gait  Gait: normal    Coordination   Romberg: negative  Finger to nose coordination: normal    Tremor   Resting tremor: absent    Reflexes   Right brachioradialis: 2+  Left brachioradialis: 2+  Right biceps: 2+  Left biceps: 2+  Right triceps: 2+  Left triceps: 2+  Right patellar: 2+  Left patellar: 2+  Right achilles: 1+  Left achilles: 1+    Physical Exam   Constitutional: She is oriented to person, place, and time. She appears well-developed and well-nourished. Cardiovascular: Normal rate.     Pulmonary/Chest: Effort normal. Neurological: She is oriented to person, place, and time. She has normal strength. She has a normal Finger-Nose-Finger Test and a normal Romberg Test. Gait normal.   Reflex Scores:       Tricep reflexes are 2+ on the right side and 2+ on the left side. Bicep reflexes are 2+ on the right side and 2+ on the left side. Brachioradialis reflexes are 2+ on the right side and 2+ on the left side. Patellar reflexes are 2+ on the right side and 2+ on the left side. Achilles reflexes are 1+ on the right side and 1+ on the left side. Skin: Skin is warm and dry. Psychiatric: She has a normal mood and affect. Her behavior is normal.   Vitals reviewed. Visit Vitals    /70    Pulse 73    Resp 18    Ht 5' 2\" (1.575 m)    Wt 66.7 kg (147 lb)    SpO2 96%    BMI 26.89 kg/m2       Lab Results  Component Value Date/Time   Glucose 81 02/23/2018 10:46 AM   LDL, calculated 112 (H) 02/23/2018 10:46 AM   Creatinine 0.60 02/23/2018 10:46 AM      Lab Results  Component Value Date/Time   Cholesterol, total 191 02/23/2018 10:46 AM   HDL Cholesterol 61 02/23/2018 10:46 AM   LDL, calculated 112 (H) 02/23/2018 10:46 AM   Triglyceride 91 02/23/2018 10:46 AM        CT Results (maximum last 3): Results from [place] encounter on 12/09/15   CT MAXILLOFACIAL WO CONT   Narrative **Final Report**      ICD Codes / Adm. Diagnosis: 86  593999 / Laceration  Dental Injury  Examination:  CT MAXILLOFACIAL WO CON  - [id number] - Dec  9 2015  7:31PM  Accession No:  [id number]  Reason:  Pain      REPORT:  EXAM:  CT MAXILLOFACIAL WO CON    INDICATION:   Trauma    COMPARISON:  None. CONTRAST:   None. TECHNIQUE:  Multislice helical CT of the facial bones was performed in the   axial plane without intravenous contrast administration. Coronal and   sagittal reformations were generated. FINDINGS:    There is no facial fracture.  Marked degenerative changes of the   temporomandibular joints bilaterally noted..    Minimal mucosal thickening is noted in the anterior ethmoid air cells on the   right. Remainder of the paranasal sinuses as well as mastoid air cells   clear. .    The globes, optic nerves and extraocular muscles are normal.    No abnormalities are identified within the visualized portions of the brain   or nasopharynx. IMPRESSION: No evidence of facial bone fracture. Signing/Reading Doctor: Edwin Fajardo ([id number])    Approved: Edwin Fajardo ([id number])  Dec  9 2015  7:55PM                               CT HEAD WO CONT   Narrative **Final Report**      ICD Codes / Adm. Diagnosis: 86  679537 / Laceration  Dental Injury  Examination:  CT HEAD WO CON  - [id number] - Dec  9 2015  7:31PM  Accession No:  [id number]  Reason:  Pain      REPORT:  EXAM:  CT HEAD WO CON    INDICATION:   Pain trauma to the face and nose and right shoulder with neck   pain    COMPARISON: None. TECHNIQUE: Unenhanced CT of the head was performed using 5 mm images. Brain   and bone windows were generated. FINDINGS:  The ventricles and sulci are normal in size, shape and configuration and   midline. There is no significant white matter disease. There is no   intracranial hemorrhage, extra-axial collection, mass, mass effect or   midline shift. The basilar cisterns are open. No acute infarct is   identified. The bone windows demonstrate no abnormalities. The visualized   portions of the paranasal sinuses and mastoid air cells are clear. IMPRESSION: Normal CT scan of the head without contrast           Signing/Reading Doctor: Edwin Fajardo ([id number])    Approved: Edwin Fajardo ([id number])  Dec  9 2015  7:52PM                                     Assessment and Plan   Diagnoses and all orders for this visit:    1. Migraine with aura and without status migrainosus, not intractable      72-year-old woman with migraine preceded by visual aura.   Headaches overall have remained the same in characteristics however with a slight increase in frequency. This may be in the setting of recent increased workload. Examination is unrevealing. Because the headache characteristics have not changed and her examination is unrevealing am going to defer imaging. We talked about possibly adding low dose preventative medication or watchful waiting. She prefers to follow conservatively and see if this corrects on its own. We will reassess in about 3 months. I reviewed and decided to continue the current medications. A notice of this visit/encounter being completed has been sent electronically to the patient's PCP and/or referring provider.      [address] [place] [address], [facility] [address]  Diplomate ABPN

## 2018-03-15 NOTE — PROGRESS NOTES
New pt here for increase in number of migraines. She states she has had migraines for about 60 years, but they have increased over the past year. Now about 2-3 a week where as they used to be 3-4 a year.

## 2018-03-15 NOTE — MR AVS SNAPSHOT
[place] [address] [address] [place] [address] 
[phone] Patient: Mack Gorman MRN: [id number] NPO:6/80/7467 Visit Information Date & Time Provider Department Dept. Phone Encounter #  
 3/15/2018  9:00  Formerly [facility] [place],  [facility] at [address] [id number] Follow-up Instructions Return in about 3 months (around 6/15/2018). Routing History Upcoming Health Maintenance Date Due ZOSTER VACCINE AGE 60> 12/18/2005 BREAST CANCER SCRN MAMMOGRAM 3/3/2016 Pneumococcal 65+ Low/Medium Risk (2 of 2 - PCV13) 6/4/2016 COLONOSCOPY 4/27/2018 GLAUCOMA SCREENING Q2Y 7/7/2018 MEDICARE YEARLY EXAM 2/24/2019 DTaP/Tdap/Td series (2 - Td) 11/15/2022 Allergies as of 3/15/2018  Review Complete On: 3/15/2018 By: Elvira Lopez Severity Noted Reaction Type Reactions Darvocet A500 [Propoxyphene N-acetaminophen]  11/15/2012   Side Effect Other (comments)  
 headache Pcn [Penicillins]  11/15/2012   Systemic Hives Percocet [Oxycodone-acetaminophen]  11/15/2012   Systemic Other (comments) Headache - severe Poison Ivy [Vit E-nonoxynol 9-aloe Vera]  11/15/2012   Systemic Rash Severe allergy Current Immunizations  Reviewed on 11/14/2016 Name Date Influenza High Dose Vaccine PF 10/23/2017 12:00 AM, 11/9/2016 Influenza Vaccine Split 10/15/2012 Pneumococcal Polysaccharide (PPSV-23) 6/4/2015 TDAP Vaccine 11/15/2012 Not reviewed this visit You Were Diagnosed With   
  
 Codes Comments Migraine with aura and without status migrainosus, not intractable    -  Primary ICD-10-CM: G43.109 ICD-9-CM: 346.00 Vitals BP Pulse Resp Height(growth percentile) Weight(growth percentile) SpO2  
 120/70 73 18 5' 2\" (1.575 m) 147 lb (66.7 kg) 96% BMI OB Status Smoking Status 26.89 kg/m2 Postmenopausal Never Smoker BMI and BSA Data Body Mass Index Body Surface Area  
 26.89 kg/m 2 1.71 m 2 Preferred Pharmacy Pharmacy Name Phone [id number], [address] [phone] Your Updated Medication List  
  
   
This list is accurate as of 3/15/18  9:22 AM.  Always use your most recent med list.  
  
  
  
  
 alendronate 70 mg tablet Commonly known as:  FOSAMAX TAKE 1 TABLET BY MOUTH ONCE A WEEK  
  
 ALEVE 220 mg tablet Generic drug:  naproxen sodium Take 220 mg by mouth two (2) times daily (with meals). ALIGN 4 mg Cap Generic drug:  Bifidobacterium Infantis Take 1 Cap by mouth daily. calcium-cholecalciferol (d3) 600-125 mg-unit Tab Take  by mouth. CLARITIN 10 mg tablet Generic drug:  loratadine Take 1 Tab by mouth daily. dicyclomine 10 mg capsule Commonly known as:  BENTYL TAKE ONE CAPSULE BY MOUTH 3 TIMES A DAY AS NEEDED ABDOMINAL PAIN OR DIARRHEA  
  
 FISH OIL 1,000 mg Cap Generic drug:  omega-3 fatty acids-vitamin e Take 1 Cap by mouth. Magnesium Oxide 500 mg Cap Take  by mouth.  
  
 multivitamin tablet Commonly known as:  ONE A DAY Take 1 Tab by mouth daily. VITAMIN D3 1,000 unit Cap Generic drug:  cholecalciferol Take 1 Cap by mouth daily. Follow-up Instructions Return in about 3 months (around 6/15/2018). Patient Instructions Migraine Aura Without a Headache: Care Instructions Your Care Instructions A migraine aura without a headache is a type of migraine. When you have an aura, you may see spots, wavy lines, or flashing lights. Your hands, arms, or face may tingle or feel numb. But unlike other migraines, a headache doesn't follow the aura. Some people have both types of migraines. Although they sometimes have an aura without the headache, at other times they may get a headache after an aura. Without treatment, migraines can last from 4 hours to a few days. Medicines can help prevent migraines or stop them once they have started. Your doctor can help you find which ones work best for you. Follow-up care is a key part of your treatment and safety. Be sure to make and go to all appointments, and call your doctor if you are having problems. It's also a good idea to know your test results and keep a list of the medicines you take. How can you care for yourself at home? · Do not drive if you have taken a prescription pain medicine. · Rest in a quiet, dark room until your aura or headache is gone. Close your eyes. Try to relax or go to sleep. Don't watch TV or read. · If you get a headache, put a cold, moist cloth or cold pack on the painful area for 10 to 20 minutes at a time. Put a thin cloth between the cold pack and your skin. · Use a warm, moist towel or a heating pad set on low. This can relax tight shoulder and neck muscles. · Have someone gently massage your neck and shoulders. · Be safe with medicines. Take your medicines exactly as prescribed. Call your doctor if you think you are having a problem with your medicine. You will get more details on the specific medicines your doctor prescribes. · Be careful not to take medicine more often than the instructions allow. This may cause worse or more frequent auras or headaches when the medicine wears off. To prevent migraines · Keep a diary so you can figure out what triggers your auras or headaches. Avoiding triggers may help you prevent migraines. Record when each aura or headache began, how long it lasted, and what the symptoms were like. Write down any other symptoms you had with the aura. These may include nausea or sensitivity to bright light or loud noise. Note if the aura or headache occurred near your period. List anything that might have triggered the aura.  Triggers may include certain foods (chocolate, cheese, wine) or odors, smoke, bright light, stress, or lack of sleep. · If your doctor has prescribed medicine for your migraines, take it as directed. You may have medicine that you take only when you get a migraine and medicine that you take all the time to help prevent migraines. ¨ If your doctor has prescribed medicine for when you get migraines, take it at the first sign of an aura, unless your doctor has given you other instructions. ¨ If your doctor has prescribed medicine to prevent migraines, take it exactly as prescribed. Call your doctor if you think you are having a problem with your medicine. · Find healthy ways to deal with stress. Migraines are most common during or right after stressful times. Take time to relax before and after you do something that has caused a migraine in the past. 
· Get plenty of sleep and exercise. · Eat regular meals, and avoid foods and drinks that often trigger migraines. These include chocolate and alcohol, especially red wine and port. Chemicals used in food, such as aspartame and monosodium glutamate (MSG), also can trigger migraines. So can some food additives, such as those found in hot dogs, oscar, cold cuts, aged cheeses, and pickled foods. · Limit caffeine by not drinking too much coffee, tea, or soda. But don't quit caffeine suddenly, because that can also give you migraines. · Do not smoke or allow others to smoke around you. If you need help quitting, talk to your doctor about stop-smoking programs and medicines. These can increase your chances of quitting for good. · If you are taking birth control pills or hormone therapy, talk to your doctor about whether they are triggering your migraines. When should you call for help? Call 911 anytime you think you may need emergency care. For example, call if: 
? · You have symptoms of a stroke.  These may include: 
¨ Sudden numbness, tingling, weakness, or loss of movement in your face, arm, or leg, especially on only one side of your body. ¨ Sudden vision changes. ¨ Sudden trouble speaking. ¨ Sudden confusion or trouble understanding simple statements. ¨ Sudden problems with walking or balance. ¨ A sudden, severe headache that is different from past headaches. ?Call your doctor now or seek immediate medical care if: 
? · You have new or worse nausea and vomiting. ? · You have a new or higher fever. ? · Your headache gets much worse. ? Watch closely for changes in your health, and be sure to contact your doctor if: 
? · You are not getting better after 2 days (48 hours). Where can you learn more? Go to http://hailey-long.info/. Enter [phone] in the search box to learn more about \"Migraine Aura Without a Headache: Care Instructions. \" Current as of: October 14, 2016 Content Version: 11.4 © 7799-2574 [employer]. Care instructions adapted under license by [employer] (which disclaims liability or warranty for this information). If you have questions about a medical condition or this instruction, always ask your healthcare professional. [address] any warranty or liability for your use of this information. Introducing [facility]! Patrick Murguia introduces [employer] patient portal. Now you can access parts of your medical record, email your doctor's office, and request medication refills online. 1. In your internet browser, go to https://[employer] ([employer]). [employer] ([employer]) 2. Click on the First Time User? Click Here link in the Sign In box. You will see the New Member Sign Up page. 3. Enter your [employer] Access Code exactly as it appears below. You will not need to use this code after youve completed the sign-up process. If you do not sign up before the expiration date, you must request a new code. · [employer] Access Code: [email or web address] Expires: 5/24/2018 11:19 AM 
 
 4. Enter the last four digits of your Social Security Number (xxxx) and Date of Birth (mm/dd/yyyy) as indicated and click Submit. You will be taken to the next sign-up page. 5. Create a [employer] ID. This will be your [employer] login ID and cannot be changed, so think of one that is secure and easy to remember. 6. Create a [employer] password. You can change your password at any time. 7. Enter your Password Reset Question and Answer. This can be used at a later time if you forget your password. 8. Enter your e-mail address. You will receive e-mail notification when new information is available in [address] 9. Click Sign Up. You can now view and download portions of your medical record. 10. Click the Download Summary menu link to download a portable copy of your medical information. If you have questions, please visit the Frequently Asked Questions section of the [employer] website. Remember, [employer] is NOT to be used for urgent needs. For medical emergencies, dial 911. Now available from your iPhone and Android! Please provide this summary of care documentation to your next provider. Your primary care clinician is listed as CARLITO FULLER. If you have any questions after today's visit, please call [phone].

## 2018-03-15 NOTE — PATIENT INSTRUCTIONS
Migraine Aura Without a Headache: Care Instructions  Your Care Instructions    A migraine aura without a headache is a type of migraine. When you have an aura, you may see spots, wavy lines, or flashing lights. Your hands, arms, or face may tingle or feel numb. But unlike other migraines, a headache doesn't follow the aura. Some people have both types of migraines. Although they sometimes have an aura without the headache, at other times they may get a headache after an aura. Without treatment, migraines can last from 4 hours to a few days. Medicines can help prevent migraines or stop them once they have started. Your doctor can help you find which ones work best for you. Follow-up care is a key part of your treatment and safety. Be sure to make and go to all appointments, and call your doctor if you are having problems. It's also a good idea to know your test results and keep a list of the medicines you take. How can you care for yourself at home? · Do not drive if you have taken a prescription pain medicine. · Rest in a quiet, dark room until your aura or headache is gone. Close your eyes. Try to relax or go to sleep. Don't watch TV or read. · If you get a headache, put a cold, moist cloth or cold pack on the painful area for 10 to 20 minutes at a time. Put a thin cloth between the cold pack and your skin. · Use a warm, moist towel or a heating pad set on low. This can relax tight shoulder and neck muscles. · Have someone gently massage your neck and shoulders. · Be safe with medicines. Take your medicines exactly as prescribed. Call your doctor if you think you are having a problem with your medicine. You will get more details on the specific medicines your doctor prescribes. · Be careful not to take medicine more often than the instructions allow. This may cause worse or more frequent auras or headaches when the medicine wears off.   To prevent migraines  · Keep a diary so you can figure out what triggers your auras or headaches. Avoiding triggers may help you prevent migraines. Record when each aura or headache began, how long it lasted, and what the symptoms were like. Write down any other symptoms you had with the aura. These may include nausea or sensitivity to bright light or loud noise. Note if the aura or headache occurred near your period. List anything that might have triggered the aura. Triggers may include certain foods (chocolate, cheese, wine) or odors, smoke, bright light, stress, or lack of sleep. · If your doctor has prescribed medicine for your migraines, take it as directed. You may have medicine that you take only when you get a migraine and medicine that you take all the time to help prevent migraines. ¨ If your doctor has prescribed medicine for when you get migraines, take it at the first sign of an aura, unless your doctor has given you other instructions. ¨ If your doctor has prescribed medicine to prevent migraines, take it exactly as prescribed. Call your doctor if you think you are having a problem with your medicine. · Find healthy ways to deal with stress. Migraines are most common during or right after stressful times. Take time to relax before and after you do something that has caused a migraine in the past.  · Get plenty of sleep and exercise. · Eat regular meals, and avoid foods and drinks that often trigger migraines. These include chocolate and alcohol, especially red wine and port. Chemicals used in food, such as aspartame and monosodium glutamate (MSG), also can trigger migraines. So can some food additives, such as those found in hot dogs, oscar, cold cuts, aged cheeses, and pickled foods. · Limit caffeine by not drinking too much coffee, tea, or soda. But don't quit caffeine suddenly, because that can also give you migraines. · Do not smoke or allow others to smoke around you.  If you need help quitting, talk to your doctor about stop-smoking programs and medicines. These can increase your chances of quitting for good. · If you are taking birth control pills or hormone therapy, talk to your doctor about whether they are triggering your migraines. When should you call for help? Call 911 anytime you think you may need emergency care. For example, call if:  ? · You have symptoms of a stroke. These may include:  ¨ Sudden numbness, tingling, weakness, or loss of movement in your face, arm, or leg, especially on only one side of your body. ¨ Sudden vision changes. ¨ Sudden trouble speaking. ¨ Sudden confusion or trouble understanding simple statements. ¨ Sudden problems with walking or balance. ¨ A sudden, severe headache that is different from past headaches. ?Call your doctor now or seek immediate medical care if:  ? · You have new or worse nausea and vomiting. ? · You have a new or higher fever. ? · Your headache gets much worse. ? Watch closely for changes in your health, and be sure to contact your doctor if:  ? · You are not getting better after 2 days (48 hours). Where can you learn more? Go to http://hailey-long.info/. Enter [phone] in the search box to learn more about \"Migraine Aura Without a Headache: Care Instructions. \"  Current as of: October 14, 2016  Content Version: 11.4  © 9160-2887 [employer], [employer]. Care instructions adapted under license by [employer] (which disclaims liability or warranty for this information). If you have questions about a medical condition or this instruction, always ask your healthcare professional. [place] [address] any warranty or liability for your use of this information.

## 2018-06-15 ENCOUNTER — OFFICE VISIT (OUTPATIENT)
Dept: NEUROLOGY | Age: 73
End: 2018-06-15

## 2018-06-15 VITALS
OXYGEN SATURATION: 97 % | WEIGHT: 147 LBS | RESPIRATION RATE: 16 BRPM | HEIGHT: 62 IN | BODY MASS INDEX: 27.05 KG/M2 | SYSTOLIC BLOOD PRESSURE: 122 MMHG | HEART RATE: 72 BPM | DIASTOLIC BLOOD PRESSURE: 80 MMHG

## 2018-06-15 DIAGNOSIS — G43.109 MIGRAINE WITH AURA AND WITHOUT STATUS MIGRAINOSUS, NOT INTRACTABLE: Primary | ICD-10-CM

## 2018-06-15 NOTE — PROGRESS NOTES
Chief Complaint   Patient presents with    Migraine       HPI    71-year-old woman with headaches here to follow-up. Last visit she was having very frequent headaches and she decided to pursue nonpharmacologic treatments. Since then she has been doing acupuncture and watching her triggers and she has had good results. She only has one headache/or every 3 weeks as opposed to twice a week as it was previously. She is happy with the level of control at this time. No acute changes since her last visit. Background:Mrs. Antony Garcia is a 71-year-old woman with a history of migraine headaches for almost 6 decades here to discuss a slight change. Typically her headaches occur every few months and/or preceded by a visual aura described as mostly right peripheral vision changes. This goes on for about 15-20 minutes. At the same time she is employing pressure-point techniques to help alleviate/prevent the diffuse throbbing migraine. Her techniques are effective. She is here because in the last few months the headaches have increased in frequency to about twice a week but otherwise have remained the same. No unusual numbness or weakness. No speech change. She has a history of IBS currently quiet. She is on mostly supplemental medication for good health. She is active physically and mentally daily. She is a volunteer at [employer]. Review of Systems   Neurological: Positive for headaches. All other systems reviewed and are negative. Past Medical History:   Diagnosis Date    Colon polyps 1996    Esophageal spasm     Migraine     Vertigo      Family History   Problem Relation Age of Onset    Hypertension Mother     Hypertension Father     MS Daughter     Other Brother      celiac sprue     Social History     Social History    Marital status:      Spouse name: N/A    Number of children: N/A    Years of education: N/A     Occupational History    Not on file.      Social History Main Topics  Smoking status: Never Smoker    Smokeless tobacco: Never Used    Alcohol use 4.8 oz/week     7 Standard drinks or equivalent, 1 Glasses of wine per week      Comment: 1 at dinner    Drug use: No    Sexual activity: Yes     Partners: Male     Other Topics Concern    Not on file     Social History Narrative     Allergies   Allergen Reactions    Darvocet A500 [Propoxyphene N-Acetaminophen] Other (comments)     headache    Pcn [Penicillins] Hives    Percocet [Oxycodone-Acetaminophen] Other (comments)     Headache - severe    Poison Ivy [Vit E-Nonoxynol 9-Aloe Vera] Rash     Severe allergy         Current Outpatient Prescriptions   Medication Sig    Bifidobacterium Infantis (ALIGN) 4 mg cap Take 1 Cap by mouth daily.  alendronate (FOSAMAX) 70 mg tablet TAKE 1 TABLET BY MOUTH ONCE A WEEK    dicyclomine (BENTYL) 10 mg capsule TAKE ONE CAPSULE BY MOUTH 3 TIMES A DAY AS NEEDED ABDOMINAL PAIN OR DIARRHEA    naproxen sodium (ALEVE) 220 mg tablet Take 220 mg by mouth two (2) times daily (with meals).  Magnesium Oxide 500 mg cap Take  by mouth.  calcium-cholecalciferol, d3, 600-125 mg-unit tab Take  by mouth.  Cholecalciferol, Vitamin D3, (VITAMIN D3) 1,000 unit cap Take 1 Cap by mouth daily.  omega-3 fatty acids-vitamin e (FISH OIL) 1,000 mg cap Take 1 Cap by mouth.  loratadine (CLARITIN) 10 mg tablet Take 1 Tab by mouth daily.  multivitamin (ONE A DAY) tablet Take 1 Tab by mouth daily. No current facility-administered medications for this visit. Neurologic Exam     Mental Status        WD/WN adult in NAD, normal grooming  VSS  A&O x 3    PERRL, nonicteric  Face is symmetric, tongue midline  Speech is fluent and clear  No limb ataxia. No abnl movements.   Moving all extemities spontaneously and symmetric  Normal gait    CVS RRR  Lungs nonlabored  Skin is warm and dry         Visit Vitals    /80    Pulse 72    Resp 16    Ht 5' 2\" (1.575 m)    Wt 66.7 kg (147 lb)    SpO2 97%    BMI 26.89 kg/m2       Assessment and Plan   Diagnoses and all orders for this visit:    1. Migraine with aura and without status migrainosus, not intractable      45-year-old woman with a long history of migraines who is having return to her baseline and is happy with the level of control at this point. She does not want to initiate medication which I agree with. We discussed triggers and risk factors for migraine. Continue current management. She is welcome to follow-up as needed.         [address] [place] [address], [facility] [address]  Diplomate LEFTYN

## 2018-06-15 NOTE — MR AVS SNAPSHOT
[facility] [address] [place] [address] 
[phone] Patient: Mauricio Santana MRN: [id number] CKE:[id number] Visit Information Date & Time Provider Department Dept. Phone Encounter #  
 6/15/2018  8:40 AM DO Joce [facility] [place] [id number] Follow-up Instructions Return if symptoms worsen or fail to improve. Routing History Upcoming Health Maintenance Date Due ZOSTER VACCINE AGE 60> 12/18/2005 BREAST CANCER SCRN MAMMOGRAM 3/27/2016 Pneumococcal 65+ Low/Medium Risk (2 of 2 - PCV13) 6/4/2016 COLONOSCOPY 4/27/2018 Influenza Age 5 to Adult 8/1/2018 MEDICARE YEARLY EXAM 2/24/2019 GLAUCOMA SCREENING Q2Y 3/12/2020 DTaP/Tdap/Td series (2 - Td) 11/15/2022 Allergies as of 6/15/2018  Review Complete On: 6/15/2018 By: Troy Jackson LPN Severity Noted Reaction Type Reactions Darvocet A500 [Propoxyphene N-acetaminophen]  11/15/2012   Side Effect Other (comments)  
 headache Pcn [Penicillins]  11/15/2012   Systemic Hives Percocet [Oxycodone-acetaminophen]  11/15/2012   Systemic Other (comments) Headache - severe Poison Ivy [Vit E-nonoxynol 9-aloe Vera]  11/15/2012   Systemic Rash Severe allergy Current Immunizations  Reviewed on 11/14/2016 Name Date Influenza High Dose Vaccine PF 10/23/2017 12:00 AM, 11/9/2016 Influenza Vaccine Split 10/15/2012 Pneumococcal Polysaccharide (PPSV-23) 6/4/2015 TDAP Vaccine 11/15/2012 Not reviewed this visit You Were Diagnosed With   
  
 Codes Comments Migraine with aura and without status migrainosus, not intractable    -  Primary ICD-10-CM: G43.109 ICD-9-CM: 346.00 Vitals BP Pulse Resp Height(growth percentile) Weight(growth percentile) SpO2  
 122/80 72 16 5' 2\" (1.575 m) 147 lb (66.7 kg) 97% BMI OB Status Smoking Status 26.89 kg/m2 Postmenopausal Never Smoker BMI and BSA Data Body Mass Index Body Surface Area  
 26.89 kg/m 2 1.71 m 2 Preferred Pharmacy Pharmacy Name Phone [id number] [phone] Your Updated Medication List  
  
   
This list is accurate as of 6/15/18  8:59 AM.  Always use your most recent med list.  
  
  
  
  
 alendronate 70 mg tablet Commonly known as:  FOSAMAX TAKE 1 TABLET BY MOUTH ONCE A WEEK  
  
 ALEVE 220 mg tablet Generic drug:  naproxen sodium Take 220 mg by mouth two (2) times daily (with meals). ALIGN 4 mg Cap Generic drug:  Bifidobacterium Infantis Take 1 Cap by mouth daily. calcium-cholecalciferol (d3) 600-125 mg-unit Tab Take  by mouth. CLARITIN 10 mg tablet Generic drug:  loratadine Take 1 Tab by mouth daily. dicyclomine 10 mg capsule Commonly known as:  BENTYL TAKE ONE CAPSULE BY MOUTH 3 TIMES A DAY AS NEEDED ABDOMINAL PAIN OR DIARRHEA  
  
 FISH OIL 1,000 mg Cap Generic drug:  omega-3 fatty acids-vitamin e Take 1 Cap by mouth. Magnesium Oxide 500 mg Cap Take  by mouth.  
  
 multivitamin tablet Commonly known as:  ONE A DAY Take 1 Tab by mouth daily. VITAMIN D3 1,000 unit Cap Generic drug:  cholecalciferol Take 1 Cap by mouth daily. Follow-up Instructions Return if symptoms worsen or fail to improve. Introducing [facility]! Lou Negrete introduces [employer] patient portal. Now you can access parts of your medical record, email your doctor's office, and request medication refills online. 1. In your internet browser, go to [email or web address] [email or web address] 2. Click on the First Time User? Click Here link in the Sign In box. You will see the New Member Sign Up page. 3. Enter your [employer] Access Code exactly as it appears below.  You will not need to use this code after youve completed the sign-up process. If you do not sign up before the expiration date, you must request a new code. · [employer] Access Code: [email or web address] Expires: 9/13/2018  8:38 AM 
 
4. Enter the last four digits of your Social Security Number (xxxx) and Date of Birth (mm/dd/yyyy) as indicated and click Submit. You will be taken to the next sign-up page. 5. Create a [employer] ID. This will be your [employer] login ID and cannot be changed, so think of one that is secure and easy to remember. 6. Create a [employer] password. You can change your password at any time. 7. Enter your Password Reset Question and Answer. This can be used at a later time if you forget your password. 8. Enter your e-mail address. You will receive e-mail notification when new information is available in [address]. 9. Click Sign Up. You can now view and download portions of your medical record. 10. Click the Download Summary menu link to download a portable copy of your medical information. If you have questions, please visit the Frequently Asked Questions section of the [employer] website. Remember, [employer] is NOT to be used for urgent needs. For medical emergencies, dial 911. Now available from your iPhone and Android! Please provide this summary of care documentation to your next provider. Your primary care clinician is listed as CARLITO FULLER. If you have any questions after today's visit, please call [phone].

## 2018-09-24 DIAGNOSIS — F51.01 PRIMARY INSOMNIA: Primary | ICD-10-CM

## 2018-09-24 RX ORDER — DICYCLOMINE HYDROCHLORIDE 10 MG/1
CAPSULE ORAL
Qty: 30 CAP | Refills: 3 | Status: SHIPPED | OUTPATIENT
Start: 2018-09-24 | End: 2019-03-01 | Stop reason: SDUPTHER

## 2018-09-24 RX ORDER — ZOLPIDEM TARTRATE 5 MG/1
5 TABLET ORAL
Qty: 10 TAB | Refills: 0 | Status: SHIPPED | OUTPATIENT
Start: 2018-09-24 | End: 2019-03-01 | Stop reason: ALTCHOICE

## 2019-01-17 ENCOUNTER — TELEPHONE (OUTPATIENT)
Dept: INTERNAL MEDICINE CLINIC | Age: 74
End: 2019-01-17

## 2019-01-17 NOTE — TELEPHONE ENCOUNTER
Pt called and wanted to know about the new pneumonia vaccine she should have. Her last pneumonia vaccine was in 2015, and she is going to [facility] in two weeks and it was recommended by the [address] that she gets it prior to her trip. She needs a call back to know what the new pneumonia vaccine is called. And if she can get it at [facility] as that is where she gets all of her vaccines done.        Call her back at [phone]

## 2019-03-01 ENCOUNTER — HOSPITAL ENCOUNTER (OUTPATIENT)
Dept: LAB | Age: 74
Discharge: HOME | End: 2019-03-01
Payer: MEDICARE

## 2019-03-01 ENCOUNTER — OFFICE VISIT (OUTPATIENT)
Dept: INTERNAL MEDICINE CLINIC | Age: 74
End: 2019-03-01

## 2019-03-01 VITALS
BODY MASS INDEX: 26.98 KG/M2 | HEART RATE: 59 BPM | TEMPERATURE: 98.9 F | DIASTOLIC BLOOD PRESSURE: 76 MMHG | SYSTOLIC BLOOD PRESSURE: 131 MMHG | OXYGEN SATURATION: 98 % | HEIGHT: 62 IN | WEIGHT: 146.6 LBS

## 2019-03-01 DIAGNOSIS — E78.00 PURE HYPERCHOLESTEROLEMIA: ICD-10-CM

## 2019-03-01 DIAGNOSIS — Z00.00 MEDICARE ANNUAL WELLNESS VISIT, SUBSEQUENT: Primary | ICD-10-CM

## 2019-03-01 DIAGNOSIS — E55.9 VITAMIN D DEFICIENCY: ICD-10-CM

## 2019-03-01 DIAGNOSIS — G89.29 CHRONIC RIGHT-SIDED LOW BACK PAIN WITH RIGHT-SIDED SCIATICA: ICD-10-CM

## 2019-03-01 DIAGNOSIS — M85.89 OSTEOPENIA OF MULTIPLE SITES: ICD-10-CM

## 2019-03-01 DIAGNOSIS — K58.0 IRRITABLE BOWEL SYNDROME WITH DIARRHEA: ICD-10-CM

## 2019-03-01 DIAGNOSIS — Z12.39 SCREENING FOR BREAST CANCER: ICD-10-CM

## 2019-03-01 DIAGNOSIS — Z78.0 POSTMENOPAUSAL: ICD-10-CM

## 2019-03-01 DIAGNOSIS — M54.41 CHRONIC RIGHT-SIDED LOW BACK PAIN WITH RIGHT-SIDED SCIATICA: ICD-10-CM

## 2019-03-01 PROCEDURE — 80053 COMPREHEN METABOLIC PANEL: CPT

## 2019-03-01 PROCEDURE — 36415 COLL VENOUS BLD VENIPUNCTURE: CPT

## 2019-03-01 PROCEDURE — 82306 VITAMIN D 25 HYDROXY: CPT

## 2019-03-01 PROCEDURE — 80061 LIPID PANEL: CPT

## 2019-03-01 RX ORDER — DICYCLOMINE HYDROCHLORIDE 10 MG/1
CAPSULE ORAL
Qty: 30 CAP | Refills: 3 | Status: SHIPPED | OUTPATIENT
Start: 2019-03-01

## 2019-03-01 NOTE — PROGRESS NOTES
Visit Vitals  /76   Pulse (!) 59   Temp 98.9 °F (37.2 °C) (Oral)   Ht 5' 2\" (1.575 m)   Wt 146 lb 9.6 oz (66.5 kg)   SpO2 98%   BMI 26.81 kg/m²     Patient states she received the flu vaccine fall 2018. 1. Have you been to the ER, urgent care clinic since your last visit? Hospitalized since your last visit? no    2. Have you seen or consulted any other health care providers outside of the [address] [place] [address] since your last visit? Include any pap smears or colon screening.  no

## 2019-03-01 NOTE — PATIENT INSTRUCTIONS
Medicare Wellness Visit, Female     The best way to live healthy is to have a lifestyle where you eat a well-balanced diet, exercise regularly, limit alcohol use, and quit all forms of tobacco/nicotine, if applicable. Regular preventive services are another way to keep healthy. Preventive services (vaccines, screening tests, monitoring & exams) can help personalize your care plan, which helps you manage your own care. Screening tests can find health problems at the earliest stages, when they are easiest to treat. [facility] follows the current, evidence-based guidelines published by the [facility] Jay Evan ([facility]) when recommending preventive services for our patients. Because we follow these guidelines, sometimes recommendations change over time as research supports it. (For example, mammograms used to be recommended annually. Even though Medicare will still pay for an annual mammogram, the newer guidelines recommend a mammogram every two years for women of average risk.)  Of course, you and your doctor may decide to screen more often for some diseases, based on your risk and your health status. Preventive services for you include:  - Medicare offers their members a free annual wellness visit, which is time for you and your primary care provider to discuss and plan for your preventive service needs. Take advantage of this benefit every year!  -All adults over the age of 72 should receive the recommended pneumonia vaccines. Current USPSTF guidelines recommend a series of two vaccines for the best pneumonia protection.   -All adults should have a flu vaccine yearly and a tetanus vaccine every 10 years. All adults age 61 and older should receive a shingles vaccine once in their lifetime.    -A bone mass density test is recommended when a woman turns 65 to screen for osteoporosis. This test is only recommended one time, as a screening.  Some providers will use this same test as a disease monitoring tool if you already have osteoporosis. -All adults age 38-68 who are overweight should have a diabetes screening test once every three years.   -Other screening tests and preventive services for persons with diabetes include: an eye exam to screen for diabetic retinopathy, a kidney function test, a foot exam, and stricter control over your cholesterol.   -Cardiovascular screening for adults with routine risk involves an electrocardiogram (ECG) at intervals determined by your doctor.   -Colorectal cancer screenings should be done for adults age 54-65 with no increased risk factors for colorectal cancer. There are a number of acceptable methods of screening for this type of cancer. Each test has its own benefits and drawbacks. Discuss with your doctor what is most appropriate for you during your annual wellness visit. The different tests include: colonoscopy (considered the best screening method), a fecal occult blood test, a fecal DNA test, and sigmoidoscopy. -Breast cancer screenings are recommended every other year for women of normal risk, age 54-69.  -Cervical cancer screenings for women over age 72 are only recommended with certain risk factors.   -All adults born between [facility] should be screened once for Hepatitis C.      Here is a list of your current Health Maintenance items (your personalized list of preventive services) with a due date:      Health Maintenance Due   Topic Date Due    Shingrix Vaccine Age 49> (1 of 2) 02/18/1996    BREAST CANCER SCRN MAMMOGRAM  Ordered today    Pneumococcal 65+ Low/Medium Risk (2 of 2 - PCV13) Up to date   [facility] COLONOSCOPY  Up to date    Influenza Age 5 to Adult  Up to date   [facility] Thompson  03/01/2020

## 2019-03-01 NOTE — PROGRESS NOTES
This is the Subsequent Medicare Annual Wellness Exam, performed 12 months or more after the Initial AWV or the last Subsequent AWV    I have reviewed the patient's medical history in detail and updated the computerized patient record. Takes Bentyl prn for abdominal pain and diarrhea. Usually takes as needed - while traveling, spicy foods etc. Medicare will no longer pay for. 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 Saw Dr. John Paul Gonsales and then Dr. Winnie Sanchez. Having back pain with radicular pain and weakness in right leg - new in 2018. Went to PT and accupuncture. Improved but better. Stretches daily. Walks 3-4 miles 4 days a week. Occ falls while travelling - while walking on uneven surfaces. Now will hold onto  and not multitask while walking. She has stopped gardening. Continues to play golf. But no more pushing or pulling clubs. Has to ride on cart. History     Past Medical History:   Diagnosis Date    Colon polyps 1996    Esophageal spasm     Migraine     Vertigo       Past Surgical History:   Procedure Laterality Date    ENDOSCOPY, COLON, DIAGNOSTIC  2012    negative; 4 th one    HX ORTHOPAEDIC Right 2001    r knee ACL, removal of loose body    HX ORTHOPAEDIC Right 1995    R wrist surgery after carpal fractures     HX ORTHOPAEDIC Right 9-2014    rotator cuff repair/ reattached biceps with screw     Current Outpatient Medications   Medication Sig Dispense Refill    dicyclomine (BENTYL) 10 mg capsule TAKE ONE CAPSULE BY MOUTH 3 TIMES A DAY AS NEEDED ABDOMINAL PAIN OR DIARRHEA 30 Cap 3    alendronate (FOSAMAX) 70 mg tablet TAKE 1 TABLET BY MOUTH ONCE A WEEK 4 Tab 11    Bifidobacterium Infantis (ALIGN) 4 mg cap Take 1 Cap by mouth daily.  naproxen sodium (ALEVE) 220 mg tablet Take 220 mg by mouth two (2) times daily (with meals).  Magnesium Oxide 500 mg cap Take  by mouth.  calcium-cholecalciferol, d3, 600-125 mg-unit tab Take  by mouth.       Cholecalciferol, Vitamin D3, (VITAMIN D3) 1,000 unit cap Take 1 Cap by mouth daily.  omega-3 fatty acids-vitamin e (FISH OIL) 1,000 mg cap Take 1 Cap by mouth.  loratadine (CLARITIN) 10 mg tablet Take 1 Tab by mouth daily. 30 Tab 3    multivitamin (ONE A DAY) tablet Take 1 Tab by mouth daily. Allergies   Allergen Reactions    Darvocet A500 [Propoxyphene N-Acetaminophen] Other (comments)     headache    Pcn [Penicillins] Hives    Percocet [Oxycodone-Acetaminophen] Other (comments)     Headache - severe    Poison Ivy [Vit E-Nonoxynol 9-Aloe Vera] Rash     Severe allergy     Family History   Problem Relation Age of Onset    Hypertension Mother     Hypertension Father     MS Daughter     Other Brother         celiac sprue     Social History     Tobacco Use    Smoking status: Never Smoker    Smokeless tobacco: Never Used   Substance Use Topics    Alcohol use: Yes     Alcohol/week: 4.8 oz     Types: 7 Standard drinks or equivalent, 1 Glasses of wine per week     Comment: 1 at dinner     Patient Active Problem List   Diagnosis Code    Allergic dermatitis due to poison ivy L23.7    Vertigo, benign paroxysmal H81.10    Migraine G43.909    Hx of basal cell carcinoma Z85.828    History of colon polyps Z86.010    Esophageal spasm K22.4    Advance directive discussed with patient Z71.89       Depression Risk Factor Screening:     3 most recent PHQ Screens 2/23/2018   Little interest or pleasure in doing things Not at all   Feeling down, depressed, irritable, or hopeless Not at all   Total Score PHQ 2 0     Alcohol Risk Factor Screening: You do not drink alcohol or very rarely. You average approximately 7 drinks a week. Functional Ability and Level of Safety:   Hearing Loss  Hearing is good. Activities of Daily Living  The home contains: no safety equipment. Patient does total self care    Fall Risk  Fall Risk Assessment, last 12 mths 3/1/2019   Able to walk? Yes   Fall in past 12 months? Yes   Fall with injury? No   Number of falls in past 12 months 2   Fall Risk Score 2       Abuse Screen  Patient is not abused    ROS:   Negative except:  Cerumen build up in ears  Esophageal spasms with difficulty swallowing at times  Pain left lateral breast from fall over 1 year ago - no masses on SBE but pain with palpiation continues in the areal.    intermittent diarrhea as above  Low back pain with right sciatica as above. Right foot - 2nd toe elevating with 3rd toe tucking underneath. Causing toenail issues. Physical Examination:  Visit Vitals  /76   Pulse (!) 59   Temp 98.9 °F (37.2 °C) (Oral)   Ht 5' 2\" (1.575 m)   Wt 146 lb 9.6 oz (66.5 kg)   SpO2 98%   BMI 26.81 kg/m²      General appearance - alert, well appearing, and in no distress and oriented to person, place, and time  Mental status - alert, oriented to person, place, and time, normal mood, behavior, speech, dress, motor activity, and thought processes  Eyes - pupils equal and reactive, extraocular eye movements intact  Ears - bilateral TM's and external ear canals normal  Nose - normal and patent, no erythema, discharge or polyps  Mouth - mucous membranes moist, pharynx normal without lesions  Neck - supple, no significant adenopathy, carotids upstroke normal bilaterally, no bruits, thyroid exam: thyroid is normal in size without nodules or tenderness  Lymphatics - no palpable lymphadenopathy, no hepatosplenomegaly  Chest - clear to auscultation, no wheezes, rales or rhonchi, symmetric air entry  Heart - normal rate, regular rhythm, normal S1, S2, no murmurs, rubs, clicks or gallops  Abdomen - soft, nontender, nondistended, no masses or organomegaly  bowel sounds normal  Breasts - breasts appear normal, no suspicious masses, no skin or nipple changes or axillary nodes. Mild tenderness left lateral breast to deep palpation.     Back exam - full range of motion, no tenderness, palpable spasm or pain on motion  Neurological - alert, oriented, normal speech, no focal findings or movement disorder noted  Musculoskeletal - no joint tenderness, deformity or swelling  Extremities - peripheral pulses normal, no pedal edema, no clubbing or cyanosis  Skin - normal coloration and turgor, no rashes, no suspicious skin lesions noted    Cognitive Screening   Evaluation of Cognitive Function:  Has your family/caregiver stated any concerns about your memory: no  Normal    Patient Care Team   Patient Care Team:  Tavon Mejias MD as PCP - General (Internal Medicine)  Jessica Lazcano MD (Obstetrics & Gynecology)  Natalie Pena MD (Ophthalmology)  Matilde Pierre MD (Dermatology)  Ru Jacob MD (Gastroenterology)    Assessment/Plan   Education and counseling provided:  Are appropriate based on today's review and evaluation   Advanced directive discussed with pt. Diagnoses and all orders for this visit:    1. Medicare annual wellness visit, subsequent    2. Screening for breast cancer  -     [facility] 3D CLAUDIA W MAMMO BI SCREENING INCL CAD; Future    3. Pure hypercholesterolemia  -     METABOLIC PANEL, COMPREHENSIVE  -     LIPID PANEL    4. Vitamin D deficiency  -     VITAMIN D, 25 HYDROXY    5. Osteopenia of multiple sites  -     DEXA BONE DENSITY STUDY AXIAL; Future    6. Postmenopausal  -     DEXA BONE DENSITY STUDY AXIAL; Future    7. Chronic right-sided low back pain with right-sided sciatica - secondary to scoliosis. Continue home exercises and modifications. 8. Irritable bowel syndrome with diarrhea - intermittent. Watch diet. Bentyl prn. Will pay for out of pocket.       Other orders  -     dicyclomine (BENTYL) 10 mg capsule; TAKE ONE CAPSULE BY MOUTH 3 TIMES A DAY AS NEEDED ABDOMINAL PAIN OR DIARRHEA        Health Maintenance Due   Topic Date Due    Shingrix Vaccine Age 49> (1 of 2) 02/18/1996    BREAST CANCER SCRN MAMMOGRAM  Ordered today    Pneumococcal 65+ Low/Medium Risk (2 of 2 - PCV13) Up to date    COLONOSCOPY  Up to date   [facility] Influenza Age 5 to Adult  Up to date    MEDICARE YEARLY EXAM  03/01/2020

## 2019-03-02 LAB
25(OH)D3+25(OH)D2 SERPL-MCNC: 53 NG/ML (ref 30–100)
ALBUMIN SERPL-MCNC: 4.2 G/DL (ref 3.5–4.8)
ALBUMIN/GLOB SERPL: 1.6 {RATIO} (ref 1.2–2.2)
ALP SERPL-CCNC: 41 IU/L (ref 39–117)
ALT SERPL-CCNC: 22 IU/L (ref 0–32)
AST SERPL-CCNC: 31 IU/L (ref 0–40)
BILIRUB SERPL-MCNC: 0.4 MG/DL (ref 0–1.2)
BUN SERPL-MCNC: 25 MG/DL (ref 8–27)
BUN/CREAT SERPL: 38 (ref 12–28)
CALCIUM SERPL-MCNC: 9.1 MG/DL (ref 8.7–10.3)
CHLORIDE SERPL-SCNC: 103 MMOL/L (ref 96–106)
CHOLEST SERPL-MCNC: 195 MG/DL (ref 100–199)
CO2 SERPL-SCNC: 24 MMOL/L (ref 20–29)
CREAT SERPL-MCNC: 0.65 MG/DL (ref 0.57–1)
GLOBULIN SER CALC-MCNC: 2.6 G/DL (ref 1.5–4.5)
GLUCOSE SERPL-MCNC: 86 MG/DL (ref 65–99)
HDLC SERPL-MCNC: 52 MG/DL
INTERPRETATION, 910389: NORMAL
LDLC SERPL CALC-MCNC: 122 MG/DL (ref 0–99)
POTASSIUM SERPL-SCNC: 4.1 MMOL/L (ref 3.5–5.2)
PROT SERPL-MCNC: 6.8 G/DL (ref 6–8.5)
SODIUM SERPL-SCNC: 141 MMOL/L (ref 134–144)
TRIGL SERPL-MCNC: 107 MG/DL (ref 0–149)
VLDLC SERPL CALC-MCNC: 21 MG/DL (ref 5–40)

## 2019-04-01 ENCOUNTER — HOSPITAL ENCOUNTER (OUTPATIENT)
Dept: MAMMOGRAPHY | Age: 74
Discharge: HOME | End: 2019-04-01
Attending: INTERNAL MEDICINE
Payer: MEDICARE

## 2019-04-01 DIAGNOSIS — Z78.0 POSTMENOPAUSAL: ICD-10-CM

## 2019-04-01 DIAGNOSIS — M85.89 OSTEOPENIA OF MULTIPLE SITES: ICD-10-CM

## 2019-04-01 DIAGNOSIS — Z12.39 SCREENING FOR BREAST CANCER: ICD-10-CM

## 2019-04-01 PROCEDURE — 77063 BREAST TOMOSYNTHESIS BI: CPT

## 2019-04-01 PROCEDURE — 77080 DXA BONE DENSITY AXIAL: CPT

## 2019-04-04 ENCOUNTER — TELEPHONE (OUTPATIENT)
Dept: INTERNAL MEDICINE CLINIC | Age: 74
End: 2019-04-04

## 2019-04-04 NOTE — TELEPHONE ENCOUNTER
Calling re: osteopenia. She has been on fosamax since 2015, off for a while in 2016 for oral surgery. ? Slight decrease in femoral neck but different machine. Continue Fosamax for an additional year. Asks about mammogram results - not reported  Also cannot get path report from Dr. Eddie Carcamo re mole removed march.

## 2019-04-05 ENCOUNTER — TELEPHONE (OUTPATIENT)
Dept: INTERNAL MEDICINE CLINIC | Age: 74
End: 2019-04-05

## 2019-04-09 NOTE — TELEPHONE ENCOUNTER
Received call back from [facility] stating [facility] Will inform Dr. Nikos Maldonado.

## 2019-05-28 RX ORDER — ALENDRONATE SODIUM 70 MG/1
TABLET ORAL
Qty: 4 TAB | Refills: 11 | Status: SHIPPED | OUTPATIENT
Start: 2019-05-28 | End: 2020-07-12

## 2019-07-01 NOTE — H&P
The patient is a 69year old female who presents with a complaint of Rectal bleeding. The patient presents due to new symptoms. The rectal bleeding is characterized as a blood mixed in stools. The onset of the rectal bleeding has been acute. The symptoms have been associated with abdominal pain and hematochezia,  while the symptoms have not been associated with family history of colon cancer, change in bowel habits, change in caliber of stools, melena, weight loss, anorexia, diarrhea, constipation, rectal pain, use of aspirin, use of NSAIDs, use of anticoagulants, history of anemia, history of gastrointestinal bleeding, history of radiation for prostate cancer, history of implanted seeds for prostate cancer, history of prostate cancer or family history of IBD. Note for \"Rectal bleeding\": she has h/o IBS takes prn bentyl, h/o lymphocytic colitis diagnosed  in 2015 on colonoscopy, due for surveillance colonoscopy next year, [place] after eating lunch c/o new onset of abdominal pain with diarrhea, then rectal bleeding last Tuesday, now resolved, took Bentyl, today she is back to her baseline      Problem List/Past Medical (Kapil Lawson; 6/6/2019 11:26 AM)  Osteoarthritis    Colonic Polyps    History of colon polyps (V12.72  Z86.010)    Constipation (564.00  K59.00)    Diarrhea (787.91  R19.7)    Abdominal swelling, generalized (789.37  R19.07)    Lymphocytic colitis (558.9  F44.621)      Past Surgical History Rolf Pfeiffer MD; 6/6/2019 12:41 PM)  Polypectomy    Rotator Cuff Surgery   (Marked as Inactive)  ACL Recunstrution   (Marked as Inactive)  Wrist Recunstruction   (Marked as Inactive)  S/P ACL repair (V45.89  Z98.890)   Right. WRIST RECONSTRUCTION (99894)   Right. Rotator Cuff Repair - Right      Medication History (Kapil Lawson; 6/6/2019 11:28 AM)  Magnesium  (Oral daily) Specific strength unknown - Active.   Naprosyn  (500MG Tablet, Oral as directed) Active. Multivitamin  (Oral 1 po qd) Active. Fish Oil  (1200MG Capsule, Oral 1 po qd) Active. Claritin  (10MG Capsule, Oral 1 po qd) Active. Vitamin D  (2000UNIT Tablet, Oral daily) Active. Align  (Oral daily) Active. Medications Reconciled     Social History Benjamin Palumbo MD; 6/6/2019 12:41 PM)  Blood Transfusion   No.  Employment status   Retired. Tobacco Use   Never smoker. Marital status   . Alcohol Use   Occasional alcohol use. Review of Systems (Adin Lawson; 6/6/2019 11:26 AM)  General Not Present- Chronic Fatigue, Poor Appetite, Weight Gain and Weight Loss. Skin Not Present- Itching, Rash and Skin Color Changes. HEENT Not Present- Hearing Loss and Vertigo. Respiratory Not Present- Difficulty Breathing and TB exposure. Cardiovascular Not Present- Chest Pain, Use of Antibiotics before Dental Procedures and Use of Blood Thinners. Gastrointestinal Present- See HPI. Musculoskeletal Not Present- Arthritis, Hip Replacement Surgery and Knee Replacement Surgery. Neurological Not Present- Weakness. Psychiatric Not Present- Depression. Endocrine Not Present- Diabetes and Thyroid Problems. Hematology Not Present- Anemia. Vitals (Adin Cannon. Kamarven Freire; 6/6/2019 11:25 AM)  6/6/2019 11:24 AM  Weight: 148 lb   Height: 64 in   Body Surface Area: 1.72 m²   Body Mass Index: 25.4 kg/m²    Pulse: 62 (Regular)     BP: 115/73 (Sitting, Left Arm, Standard)              Physical Exam Benjamin Palumbo MD; 6/6/2019 12:40 PM)  General  Mental Status - Alert. General Appearance - Cooperative, Pleasant and Consistent with stated age, Not Anxious, Not in acute distress. Orientation - Oriented X3. Build & Nutrition - Well nourished and Well developed.     Chest and Lung Exam  Chest and lung exam reveals  - normal excursion with symmetric chest walls, quiet, even and easy respiratory effort with no use of accessory muscles and on auscultation, normal breath sounds, no adventitious sounds and normal vocal resonance. Cardiovascular  Cardiovascular examination reveals  - normal heart sounds, regular rate and rhythm with no murmurs and no digital clubbing, cyanosis, edema, increased warmth or tenderness. Abdomen  Inspection  Inspection of the abdomen reveals - Soft, Non-distended. Incisional scars - No incisional scars. Palpation/Percussion  Tenderness - Non-Tender. Rebound tenderness - No rebound. Liver - No hepatosplenomegaly. Abdominal Mass Palpable - No masses. Other Characteristics - No Ascites. Auscultation  Auscultation of the abdomen reveals - Bowel sounds normal.        Assessment & Plan Radu Miramontes MD; 6/6/2019 12:41 PM)  Rectal/anal hemorrhage (569.3  K62.5)  Impression: she has h/o IBS takes prn bentyl, h/o lymphocytic colitis diagnosed in 2015 on colonoscopy, due for surveillance colonoscopy next year, went to [facility] last weekend, after eating lunch c/o new onset of abdominal pain with diarrhea, then rectal bleeding last Tuesday, now resolved, took Bentyl, today she is back to her baseline  suspect it from hemorrhoids, needs colonoscopy to r/o any colonic neoplasm  Current Plans  Colonoscopy (72215) (Discussed risks and benefits with the patient to include:; perforation, post polypectomy, or post biopsy bleeding, missed lesions, and sedation reactions.)  Started Suprep Bowel Prep Kit 17.5-3.13-1.6GM/177ML, 1 (one) KIt container Milliliter as directed, 354 Milliliter, 1 day starting 06/06/2019, No Refill. Pt Education - How to access health information online: discussed with patient and provided information. Patient is to call me for any questions or concerns. Diarrhea (787.91  R19.7)  Lymphocytic colitis (558.9  W78.607)    Date of Surgery Update: Vania Franco was seen and examined. History and physical has been reviewed. The patient has been examined.  There have been no significant clinical changes since the completion of the originally dated History and Physical.    Signed By: Cha Armijo MD     July 2, 2019 8:14 AM

## 2019-07-02 ENCOUNTER — HOSPITAL ENCOUNTER (OUTPATIENT)
Age: 74
Setting detail: OUTPATIENT SURGERY
Discharge: HOME | End: 2019-07-02
Attending: INTERNAL MEDICINE | Admitting: INTERNAL MEDICINE
Payer: MEDICARE

## 2019-07-02 ENCOUNTER — ANESTHESIA EVENT (OUTPATIENT)
Dept: ENDOSCOPY | Age: 74
End: 2019-07-02
Payer: MEDICARE

## 2019-07-02 ENCOUNTER — ANESTHESIA (OUTPATIENT)
Dept: ENDOSCOPY | Age: 74
End: 2019-07-02
Payer: MEDICARE

## 2019-07-02 VITALS
DIASTOLIC BLOOD PRESSURE: 54 MMHG | TEMPERATURE: 97.9 F | SYSTOLIC BLOOD PRESSURE: 121 MMHG | RESPIRATION RATE: 18 BRPM | HEART RATE: 48 BPM | WEIGHT: 146 LBS | OXYGEN SATURATION: 100 % | BODY MASS INDEX: 26.87 KG/M2 | HEIGHT: 62 IN

## 2019-07-02 PROCEDURE — 74011250636 HC RX REV CODE- 250/636

## 2019-07-02 PROCEDURE — 76040000007: Performed by: INTERNAL MEDICINE

## 2019-07-02 PROCEDURE — 76060000032 HC ANESTHESIA 0.5 TO 1 HR: Performed by: INTERNAL MEDICINE

## 2019-07-02 PROCEDURE — 77030027957 HC TBNG IRR ENDOGTR BUSS -B: Performed by: INTERNAL MEDICINE

## 2019-07-02 RX ORDER — FLUMAZENIL 0.1 MG/ML
0.2 INJECTION INTRAVENOUS
Status: DISCONTINUED | OUTPATIENT
Start: 2019-07-02 | End: 2019-07-02 | Stop reason: HOSPADM

## 2019-07-02 RX ORDER — MIDAZOLAM HYDROCHLORIDE 1 MG/ML
.25-5 INJECTION, SOLUTION INTRAMUSCULAR; INTRAVENOUS
Status: DISCONTINUED | OUTPATIENT
Start: 2019-07-02 | End: 2019-07-02 | Stop reason: HOSPADM

## 2019-07-02 RX ORDER — SODIUM CHLORIDE 0.9 % (FLUSH) 0.9 %
5-40 SYRINGE (ML) INJECTION AS NEEDED
Status: DISCONTINUED | OUTPATIENT
Start: 2019-07-02 | End: 2019-07-02 | Stop reason: HOSPADM

## 2019-07-02 RX ORDER — DEXTROMETHORPHAN/PSEUDOEPHED 2.5-7.5/.8
1.2 DROPS ORAL
Status: DISCONTINUED | OUTPATIENT
Start: 2019-07-02 | End: 2019-07-02 | Stop reason: HOSPADM

## 2019-07-02 RX ORDER — FENTANYL CITRATE 50 UG/ML
25-200 INJECTION, SOLUTION INTRAMUSCULAR; INTRAVENOUS
Status: DISCONTINUED | OUTPATIENT
Start: 2019-07-02 | End: 2019-07-02 | Stop reason: HOSPADM

## 2019-07-02 RX ORDER — PROPOFOL 10 MG/ML
INJECTION, EMULSION INTRAVENOUS AS NEEDED
Status: DISCONTINUED | OUTPATIENT
Start: 2019-07-02 | End: 2019-07-02 | Stop reason: HOSPADM

## 2019-07-02 RX ORDER — SODIUM CHLORIDE 9 MG/ML
INJECTION, SOLUTION INTRAVENOUS
Status: DISCONTINUED | OUTPATIENT
Start: 2019-07-02 | End: 2019-07-02 | Stop reason: HOSPADM

## 2019-07-02 RX ORDER — EPINEPHRINE 0.1 MG/ML
1 INJECTION INTRACARDIAC; INTRAVENOUS
Status: DISCONTINUED | OUTPATIENT
Start: 2019-07-02 | End: 2019-07-02 | Stop reason: HOSPADM

## 2019-07-02 RX ORDER — SODIUM CHLORIDE 0.9 % (FLUSH) 0.9 %
5-40 SYRINGE (ML) INJECTION EVERY 8 HOURS
Status: DISCONTINUED | OUTPATIENT
Start: 2019-07-02 | End: 2019-07-02 | Stop reason: HOSPADM

## 2019-07-02 RX ORDER — NALOXONE HYDROCHLORIDE 0.4 MG/ML
0.4 INJECTION, SOLUTION INTRAMUSCULAR; INTRAVENOUS; SUBCUTANEOUS
Status: DISCONTINUED | OUTPATIENT
Start: 2019-07-02 | End: 2019-07-02 | Stop reason: HOSPADM

## 2019-07-02 RX ORDER — ATROPINE SULFATE 0.1 MG/ML
0.5 INJECTION INTRAVENOUS
Status: DISCONTINUED | OUTPATIENT
Start: 2019-07-02 | End: 2019-07-02 | Stop reason: HOSPADM

## 2019-07-02 RX ORDER — SODIUM CHLORIDE 9 MG/ML
50 INJECTION, SOLUTION INTRAVENOUS CONTINUOUS
Status: DISCONTINUED | OUTPATIENT
Start: 2019-07-02 | End: 2019-07-02 | Stop reason: HOSPADM

## 2019-07-02 RX ADMIN — PROPOFOL 50 MG: 10 INJECTION, EMULSION INTRAVENOUS at 08:24

## 2019-07-02 RX ADMIN — PROPOFOL 50 MG: 10 INJECTION, EMULSION INTRAVENOUS at 08:20

## 2019-07-02 RX ADMIN — PROPOFOL 50 MG: 10 INJECTION, EMULSION INTRAVENOUS at 08:17

## 2019-07-02 RX ADMIN — PROPOFOL 50 MG: 10 INJECTION, EMULSION INTRAVENOUS at 08:26

## 2019-07-02 RX ADMIN — SODIUM CHLORIDE: 9 INJECTION, SOLUTION INTRAVENOUS at 08:05

## 2019-07-02 NOTE — PERIOP NOTES

## 2019-07-02 NOTE — ANESTHESIA PREPROCEDURE EVALUATION
Relevant Problems   No relevant active problems       Anesthetic History   No history of anesthetic complications            Review of Systems / Medical History  Patient summary reviewed, nursing notes reviewed and pertinent labs reviewed    Pulmonary  Within defined limits                 Neuro/Psych   Within defined limits           Cardiovascular                  Exercise tolerance: >4 METS     GI/Hepatic/Renal               Comments: screening Endo/Other  Within defined limits           Other Findings              Physical Exam    Airway  Mallampati: I  TM Distance: > 6 cm  Neck ROM: normal range of motion   Mouth opening: Normal     Cardiovascular    Rhythm: regular  Rate: normal         Dental  No notable dental hx       Pulmonary  Breath sounds clear to auscultation               Abdominal         Other Findings            Anesthetic Plan    ASA: 2  Anesthesia type: MAC          Induction: Intravenous  Anesthetic plan and risks discussed with: Patient

## 2019-07-02 NOTE — DISCHARGE INSTRUCTIONS
[address]  [address]    COLON DISCHARGE INSTRUCTIONS    Jean Claude Jenkins  [id number]  1945    Discomfort:  Redness at IV site- apply warm compress to area; if redness or soreness persist- contact your physician  There may be a slight amount of blood passed from the rectum  Gaseous discomfort- walking, belching will help relieve any discomfort  You may not operate a vehicle for 12 hours  You may not engage in an occupation involving machinery or appliances for rest of today  You may not drink alcoholic beverages for at least 12 hours  Avoid making any critical decisions for at least 24 hour  DIET:  You may resume your regular diet - however -  remember your colon is empty and a heavy meal will produce gas. Avoid these foods:  vegetables, fried / greasy foods, carbonated drinks     ACTIVITY:  You may  resume your normal daily activities it is recommended that you spend the remainder of the day resting -  avoid any strenuous activity. CALL M.D.   ANY SIGN OF:   Increasing pain, nausea, vomiting  Abdominal distension (swelling)  New increased bleeding (oral or rectal)  Fever (chills)  Pain in chest area  Bloody discharge from nose or mouth  Shortness of breath      Follow-up Instructions:   Call Dr. Pavel Salinas for any questions or problems at [phone] and follow up as needed   High fiber diet             ENDOSCOPY FINDINGS:   Your colonoscopy showed internal hemorrhoids, rest of exam was normal.  Telephone # [phone]      Signed By: Pavel Salinas MD     7/2/2019  8:44 AM       DISCHARGE SUMMARY from Nurse    The following personal items collected during your admission are returned to you:   Dental Appliance: Dental Appliances: None  Vision: Visual Aid: None  Hearing Aid:    Jewelry:    Clothing:    Other Valuables:    Valuables sent to safe:

## 2019-07-02 NOTE — PROCEDURES
[address]   [place], [address] [facility]      Colonoscopy Operative Report    Babita Frost  [id number]  1945      Procedure Type:   Colonoscopy --diagnostic     Indications:    Hematochezia/melena   Date of last colonoscopy: 2015, Polyps  Yes    Pre-operative Diagnosis: see indication above    Post-operative Diagnosis:  See findings below    :  Sharron Carter MD    Surgical Assistant: None    Implants:  None    Referring Provider: Keith Rivera MD      Sedation:  MAC anesthesia Propofol      Procedure Details:  After informed consent was obtained with all risks and benefits of procedure explained and preoperative exam completed, the patient was taken to the endoscopy suite and placed in the left lateral decubitus position. Upon sequential sedation as per above, a digital rectal exam was performed demonstrating internal hemorrhoids. The Olympus videocolonoscope  was inserted in the rectum and carefully advanced to the cecum, which was identified by the ileocecal valve and appendiceal orifice. The cecum was identified by the ileocecal valve and appendiceal orifice. The quality of preparation was good. The colonoscope was slowly withdrawn with careful evaluation between folds. Retroflexion in the rectum was completed . Findings:   Rectum: normal  Grade 2 internal hemorrhoids  Sigmoid: normal  Descending Colon: normal  Transverse Colon: normal  Ascending Colon: normal  Cecum: normal        Specimen Removed:  none    Complications: None. EBL:  None.     Impression:    see findings    Recommendations: --Repeat colonoscopy in 5 years because of h/o polyps  Her bleeding stopped    High fiber diet        Signed By: Sharron Carter MD     7/2/2019  8:41 AM

## 2019-07-02 NOTE — ROUTINE PROCESS
Patito Lai 1945 
[id number] Situation: 
Verbal report received from: LAURA Martell  Procedure: Procedure(s): 
COLONOSCOPY Background: 
 
Preoperative diagnosis: RECTAL BLEEDING Postoperative diagnosis: Small Hemorrhoids :  Dr. Wiliam Tripathi Assistant(s): Endoscopy Technician-1: Dwain Winters IV 
Endoscopy RN-1: Anatoly Parsons RN Specimens: * No specimens in log * H. Pylori  no Assessment: 
Intra-procedure medications Anesthesia gave intra-procedure sedation and medications, see anesthesia flow sheet yes Intravenous fluids: NS@ [facility] Vital signs stable Abdominal assessment: round and soft Recommendation: 
Discharge patient per MD order. Family or Friend Permission to share finding with family or friend yes

## 2019-07-02 NOTE — ANESTHESIA POSTPROCEDURE EVALUATION
Post-Anesthesia Evaluation and Assessment    Patient: Marie Irby MRN: [id number]  SSN: [id number]    YOB: 1945  Age: 69 y.o. Sex: female      I have evaluated the patient and they are stable and ready for discharge from the PACU. Cardiovascular Function/Vital Signs  Visit Vitals  /57   Pulse (!) 47   Temp 36.6 °C (97.9 °F)   Resp 18   Ht 5' 2\" (1.575 m)   Wt 66.2 kg (146 lb)   SpO2 100%   BMI 26.70 kg/m²       Patient is status post MAC anesthesia for Procedure(s):  COLONOSCOPY. Nausea/Vomiting: None    Postoperative hydration reviewed and adequate. Pain:  Pain Scale 1: Numeric (0 - 10) (07/02/19 0852)  Pain Intensity 1: 0 (07/02/19 9012)   Managed    Neurological Status: At baseline    Mental Status, Level of Consciousness: Alert and  oriented to person, place, and time    Pulmonary Status:   O2 Device: Room air (07/02/19 3027)   Adequate oxygenation and airway patent    Complications related to anesthesia: None    Post-anesthesia assessment completed. No concerns    Signed By: Jessica Null MD     July 2, 2019              Procedure(s):  COLONOSCOPY. MAC    <BSHSIANPOST>    Vitals Value Taken Time   BP 97/65 7/2/2019  8:57 AM   Temp 36.6 °C (97.9 °F) 7/2/2019  8:52 AM   Pulse 45 7/2/2019  8:56 AM   Resp 19 7/2/2019  8:56 AM   SpO2 100 % 7/2/2019  8:56 AM   Vitals shown include unvalidated device data.

## 2019-08-19 ENCOUNTER — OFFICE VISIT (OUTPATIENT)
Dept: INTERNAL MEDICINE CLINIC | Age: 74
End: 2019-08-19

## 2019-08-19 VITALS
SYSTOLIC BLOOD PRESSURE: 103 MMHG | TEMPERATURE: 98.2 F | RESPIRATION RATE: 16 BRPM | DIASTOLIC BLOOD PRESSURE: 72 MMHG | WEIGHT: 144.8 LBS | HEIGHT: 62 IN | HEART RATE: 62 BPM | BODY MASS INDEX: 26.65 KG/M2 | OXYGEN SATURATION: 98 %

## 2019-08-19 DIAGNOSIS — B35.1 ONYCHOMYCOSIS OF TOENAIL: ICD-10-CM

## 2019-08-19 DIAGNOSIS — M62.838 NECK MUSCLE SPASM: Primary | ICD-10-CM

## 2019-08-19 PROBLEM — G89.29 CHRONIC BILATERAL LOW BACK PAIN WITH RIGHT-SIDED SCIATICA: Status: ACTIVE | Noted: 2019-08-19

## 2019-08-19 PROBLEM — M54.41 CHRONIC BILATERAL LOW BACK PAIN WITH RIGHT-SIDED SCIATICA: Status: ACTIVE | Noted: 2019-08-19

## 2019-08-19 RX ORDER — TERBINAFINE HYDROCHLORIDE 250 MG/1
250 TABLET ORAL DAILY
Qty: 84 TAB | Refills: 0 | Status: SHIPPED | OUTPATIENT
Start: 2019-08-19 | End: 2019-11-11

## 2019-08-19 RX ORDER — CYCLOBENZAPRINE HCL 10 MG
5-10 TABLET ORAL
Qty: 30 TAB | Refills: 0 | Status: SHIPPED | OUTPATIENT
Start: 2019-08-19 | End: 2019-08-30 | Stop reason: SINTOL

## 2019-08-19 NOTE — PROGRESS NOTES
Joel Solis is a 69 y.o. female  Chief Complaint   Patient presents with    Tingling     left hand started 8/18/19 off and on through out the day     Visit Vitals  /72 (BP 1 Location: Left arm, BP Patient Position: At rest)   Pulse 62   Temp 98.2 °F (36.8 °C) (Oral)   Resp 16   Ht 5' 2\" (1.575 m)   Wt 144 lb 12.8 oz (65.7 kg)   SpO2 98%   BMI 26.48 kg/m²      Health Maintenance Due   Topic Date Due    Shingrix Vaccine Age 49> (1 of 2) 02/18/1996    Pneumococcal 65+ years (2 of 2 - PCV13) 06/04/2016    Influenza Age 5 to Adult  08/01/2019     HPI  2 new problems today:     L hand tingling since yesterday - woke up yesterday morning after sleeping on side/back. Distal 2 middle fingers were numb, then remaining fingers became   Neck flexion seems to be a trigger - pt notes that she suspects impingement   Last night tried to lay on back all night without pillow  Now having only mild decrease in sensitivity     Takes Naproxen 220 with 2-3 meals/day for low back pain/sciatica. This has been stable. Has seen ortho in the past for this. R foot - 2nd and 3rd toenails thick and scaling for the past 1+ year. Review of Systems   Constitutional: Negative for fever. Respiratory: Negative for shortness of breath. Musculoskeletal: Positive for neck pain. Skin: Negative for itching and rash. Neurological: Positive for sensory change. Negative for dizziness, loss of consciousness and weakness. Psychiatric/Behavioral: Negative for depression and substance abuse. Physical Exam   Constitutional: She is oriented to person, place, and time. She appears well-developed and well-nourished. No distress. HENT:   Head: Normocephalic and atraumatic. Neck: Neck supple. No JVD present. No bruit bilateral carotid arteries. Cardiovascular: Normal rate, regular rhythm and normal heart sounds. Pulmonary/Chest: Effort normal and breath sounds normal. No respiratory distress.    Musculoskeletal: Normal range of motion. She exhibits no edema or tenderness. Spasm noted B posterior neck & upper back musculature. Neurological: She is alert and oriented to person, place, and time. Skin: Skin is warm and dry. No erythema. R 2nd and 3rd toenails thick and scaling. Psychiatric: She has a normal mood and affect. Her behavior is normal. Judgment and thought content normal.   Nursing note and vitals reviewed. Diagnoses and all orders for this visit:    1. Neck muscle spasm  -     cyclobenzaprine (FLEXERIL) 10 mg tablet; Take 0.5-1 Tabs by mouth three (3) times daily as needed for Muscle Spasm(s). Call for xray and possible referral to spine INI 1 week    2. Onychomycosis of toenail  -     terbinafine HCl (LAMISIL) 250 mg tablet; Take 1 Tab by mouth daily for 84 days. Warned pt that she must avoid alcohol during tx. She and  agree.  Previous LFTs have been normal.

## 2019-08-19 NOTE — PROGRESS NOTES
1. Have you been to the ER, urgent care clinic since your last visit? Hospitalized since your last visit? No    2. Have you seen or consulted any other health care providers outside of the [address] [place] [address] since your last visit? Include any pap smears or colon screening.  No   Chief Complaint   Patient presents with   Pearlene Lease     left hand started 8/18/19 off and on through out the day     Not fasting

## 2019-08-30 ENCOUNTER — OFFICE VISIT (OUTPATIENT)
Dept: INTERNAL MEDICINE CLINIC | Age: 74
End: 2019-08-30

## 2019-08-30 VITALS
TEMPERATURE: 98.6 F | OXYGEN SATURATION: 95 % | WEIGHT: 145 LBS | BODY MASS INDEX: 26.68 KG/M2 | HEIGHT: 62 IN | HEART RATE: 74 BPM | SYSTOLIC BLOOD PRESSURE: 122 MMHG | DIASTOLIC BLOOD PRESSURE: 73 MMHG

## 2019-08-30 DIAGNOSIS — M79.2 RADICULAR PAIN IN LEFT ARM: ICD-10-CM

## 2019-08-30 DIAGNOSIS — M54.2 NECK PAIN ON LEFT SIDE: Primary | ICD-10-CM

## 2019-08-30 NOTE — PATIENT INSTRUCTIONS
Neck Strain or Sprain: Rehab Exercises  Introduction  Here are some examples of exercises for you to try. The exercises may be suggested for a condition or for rehabilitation. Start each exercise slowly. Ease off the exercises if you start to have pain. You will be told when to start these exercises and which ones will work best for you. How to do the exercises  Neck rotation    1. Sit in a firm chair, or stand up straight. 2. Keeping your chin level, turn your head to the right, and hold for 15 to 30 seconds. 3. Turn your head to the left and hold for 15 to 30 seconds. 4. Repeat 2 to 4 times to each side. Neck stretches    1. Look straight ahead, and tip your right ear to your right shoulder. Do not let your left shoulder rise up as you tip your head to the right. 2. Hold for 15 to 30 seconds. 3. Tilt your head to the left. Do not let your right shoulder rise up as you tip your head to the left. 4. Hold for 15 to 30 seconds. 5. Repeat 2 to 4 times to each side. Forward neck flexion    1. Sit in a firm chair, or stand up straight. 2. Bend your head forward. 3. Hold for 15 to 30 seconds. 4. Repeat 2 to 4 times. Lateral (side) bend strengthening    1. With your right hand, place your first two fingers on your right temple. 2. Start to bend your head to the side while using gentle pressure from your fingers to keep your head from bending. 3. Hold for about 6 seconds. 4. Repeat 8 to 12 times. 5. Switch hands and repeat the same exercise on your left side. Forward bend strengthening    1. Place your first two fingers of either hand on your forehead. 2. Start to bend your head forward while using gentle pressure from your fingers to keep your head from bending. 3. Hold for about 6 seconds. 4. Repeat 8 to 12 times. Neutral position strengthening    1. Using one hand, place your fingertips on the back of your head at the top of your neck.   2. Start to bend your head backward while using gentle pressure from your fingers to keep your head from bending. 3. Hold for about 6 seconds. 4. Repeat 8 to 12 times. Chin tuck    1. Lie on the floor with a rolled-up towel under your neck. Your head should be touching the floor. 2. Slowly bring your chin toward your chest.  3. Hold for a count of 6, and then relax for up to 10 seconds. 4. Repeat 8 to 12 times. Follow-up care is a key part of your treatment and safety. Be sure to make and go to all appointments, and call your doctor if you are having problems. It's also a good idea to know your test results and keep a list of the medicines you take. Where can you learn more? Go to http://hailey-long.info/. Enter M679 in the search box to learn more about \"Neck Strain or Sprain: Rehab Exercises. \"  Current as of: September 20, 2018  Content Version: 12.1  © 6319-3877 Healthwise, [employer]. Care instructions adapted under license by [employer] (which disclaims liability or warranty for this information). If you have questions about a medical condition or this instruction, always ask your healthcare professional. [address] any warranty or liability for your use of this information.

## 2019-08-30 NOTE — PROGRESS NOTES
HISTORY OF PRESENT ILLNESS  Vania Morgan is a 69 y.o. female. Saw JEREMY Mina on 8/19 for numbness in her left hand which was worsened by neck flexion. Hand/finger numbness has mostly resolved but continued numbness tip of left thumb. She has been taking Naprosyn 220mg 2-3 times daily. Given  Flexeril but called back this week that is was causing dizziness with a feeling of hangover, fuzzy headed, poor balance. XR of cervical spine in 2015 after fall showed degenerative changes. She has had previous episodes of pain in neck but never with radicular symptoms. 10 years ago had a course of PT which helped. Trying since episode to work on her posture. Current Outpatient Medications:     terbinafine HCl (LAMISIL) 250 mg tablet, Take 1 Tab by mouth daily for 84 days. , Disp: 84 Tab, Rfl: 0    alendronate (FOSAMAX) 70 mg tablet, TAKE 1 TABLET BY MOUTH ONCE A WEEK, Disp: 4 Tab, Rfl: 11    dicyclomine (BENTYL) 10 mg capsule, TAKE ONE CAPSULE BY MOUTH 3 TIMES A DAY AS NEEDED ABDOMINAL PAIN OR DIARRHEA, Disp: 30 Cap, Rfl: 3    Bifidobacterium Infantis (ALIGN) 4 mg cap, Take 1 Cap by mouth daily. , Disp: , Rfl:     naproxen sodium (ALEVE) 220 mg tablet, Take 220 mg by mouth two (2) times daily (with meals). , Disp: , Rfl:     Magnesium Oxide 500 mg cap, Take  by mouth., Disp: , Rfl:     calcium-cholecalciferol, d3, 600-125 mg-unit tab, Take  by mouth., Disp: , Rfl:     Cholecalciferol, Vitamin D3, (VITAMIN D3) 1,000 unit cap, Take 1 Cap by mouth daily. , Disp: , Rfl:     omega-3 fatty acids-vitamin e (FISH OIL) 1,000 mg cap, Take 1 Cap by mouth., Disp: , Rfl:     loratadine (CLARITIN) 10 mg tablet, Take 1 Tab by mouth daily. , Disp: 30 Tab, Rfl: 3    multivitamin (ONE A DAY) tablet, Take 1 Tab by mouth daily.   , Disp: , Rfl:     Visit Vitals  /73   Pulse 74   Temp 98.6 °F (37 °C) (Oral)   Ht 5' 2\" (1.575 m)   Wt 145 lb (65.8 kg)   SpO2 95%   BMI 26.52 kg/m²       ROS  See above  Physical Exam   Constitutional: She appears well-developed and well-nourished. HENT:   Head: Normocephalic and atraumatic. Neck:   Cspine, paraspinal muscle nontender. Tenderness right trapezius muscle with palpable spasms. Vitals reviewed. ASSESSMENT and PLAN  Left neck pain and with left arm radicular pain - stop flexeril. Referred to PT. Restart naprosyn. No orders of the defined types were placed in this encounter.

## 2019-10-17 ENCOUNTER — TELEPHONE (OUTPATIENT)
Dept: INTERNAL MEDICINE CLINIC | Age: 74
End: 2019-10-17

## 2019-10-17 NOTE — TELEPHONE ENCOUNTER
LM - if with abdominal pain or bloody or black stools she needs to go to er. Otherwise supportive treatment with clear liquids until diarrhea resolves.

## 2019-10-17 NOTE — TELEPHONE ENCOUNTER
Pt stated that she has diarrhea and too afraid to leave the house. She has taken her dicyclomine and that does not seem to be helping. She would like to know what else could she do. Please call.

## 2019-10-28 NOTE — PROGRESS NOTES
HISTORY OF PRESENT ILLNESS  Annabel Pheobe Leventhal is a 69 y.o. female. HPI  Here for follow up of her diarrhea predominant IBS. She had been taking Bentyl in the past but finds it is not working. Also takes probiotic daily. Increased stress with family and grandchildren. Went on E96 and did well but had to travel and diarrhea returned. Back on BRAT diet. She has lost approx 5 lbs. No bloody or melanotic stools. Worse in the am.  Gets out of bed for urgent watery diarrhea x 30 minutes then resolved for the rest of the day. She may have a smaller amt of diarrhea (liguid to wet mud)  later in the day. She has been taking bentyl regularly for the last 2 weeks. Had colonoscopy earlier this year after an episode of bloody diarrhea. Nml through Dr. Kasi Black in 7/2019. Bleeding was from a hemorrhoid. No sick contacts. Ears are plugged from the inside. Had wax cleaned out but ears are plugged. Denies rhinorrhea. Current Outpatient Medications:     terbinafine HCl (LAMISIL) 250 mg tablet, Take 1 Tab by mouth daily for 84 days. , Disp: 84 Tab, Rfl: 0    alendronate (FOSAMAX) 70 mg tablet, TAKE 1 TABLET BY MOUTH ONCE A WEEK, Disp: 4 Tab, Rfl: 11    dicyclomine (BENTYL) 10 mg capsule, TAKE ONE CAPSULE BY MOUTH 3 TIMES A DAY AS NEEDED ABDOMINAL PAIN OR DIARRHEA, Disp: 30 Cap, Rfl: 3    Bifidobacterium Infantis (ALIGN) 4 mg cap, Take 1 Cap by mouth daily. , Disp: , Rfl:     naproxen sodium (ALEVE) 220 mg tablet, Take 220 mg by mouth two (2) times daily (with meals). , Disp: , Rfl:     Magnesium Oxide 500 mg cap, Take  by mouth., Disp: , Rfl:     calcium-cholecalciferol, d3, 600-125 mg-unit tab, Take  by mouth., Disp: , Rfl:     Cholecalciferol, Vitamin D3, (VITAMIN D3) 1,000 unit cap, Take 1 Cap by mouth daily. , Disp: , Rfl:     omega-3 fatty acids-vitamin e (FISH OIL) 1,000 mg cap, Take 1 Cap by mouth., Disp: , Rfl:     loratadine (CLARITIN) 10 mg tablet, Take 1 Tab by mouth daily. , Disp: 30 Tab, Rfl: 3    multivitamin (ONE A DAY) tablet, Take 1 Tab by mouth daily. , Disp: , Rfl:     ROS  See above  Physical Exam   Constitutional: She appears well-developed and well-nourished. HENT:   Head: Normocephalic and atraumatic. Mouth/Throat: Oropharynx is clear and moist.   EARS: bilat ear fullness, left ext canal with mild-mod cerumen  Nares - mild congestion with clear discharge  Sinuses nontender   Neck: Neck supple. No thyromegaly present. Cardiovascular: Normal rate, regular rhythm and normal heart sounds. Exam reveals no gallop and no friction rub. No murmur heard. Pulmonary/Chest: Effort normal and breath sounds normal.   Abdominal: Soft. Bowel sounds are normal. She exhibits no distension and no mass. There is no tenderness. Musculoskeletal: She exhibits no edema. Lymphadenopathy:     She has no cervical adenopathy. Vitals reviewed. ASSESSMENT and PLAN  IBS diarrhea predominant - flare over last month. Trial Chlor-Clid over Dicyclomine. FODMAP diet. Continue probiotics. Eustachian tube dysfunction - Flonase trial.    Flu shot admin this visit.     Orders Placed This Encounter    INFLUENZA VACCINE INACTIVATED (IIV), SUBUNIT, ADJUVANTED, IM    clindinium-chlordiazePOXIDE (LIBRAX, WITH CLINIDIUM,) 5-2.5 mg per capsule

## 2019-10-29 ENCOUNTER — OFFICE VISIT (OUTPATIENT)
Dept: INTERNAL MEDICINE CLINIC | Age: 74
End: 2019-10-29

## 2019-10-29 VITALS
SYSTOLIC BLOOD PRESSURE: 112 MMHG | TEMPERATURE: 98.7 F | HEIGHT: 62 IN | HEART RATE: 60 BPM | OXYGEN SATURATION: 99 % | BODY MASS INDEX: 26.31 KG/M2 | DIASTOLIC BLOOD PRESSURE: 56 MMHG | WEIGHT: 143 LBS

## 2019-10-29 DIAGNOSIS — H69.83 DYSFUNCTION OF BOTH EUSTACHIAN TUBES: ICD-10-CM

## 2019-10-29 DIAGNOSIS — Z23 ENCOUNTER FOR IMMUNIZATION: ICD-10-CM

## 2019-10-29 DIAGNOSIS — K58.0 IRRITABLE BOWEL SYNDROME WITH DIARRHEA: Primary | ICD-10-CM

## 2019-10-29 RX ORDER — CHLORDIAZEPOXIDE HYDROCHLORIDE AND CLIDINIUM BROMIDE 5; 2.5 MG/1; MG/1
1 CAPSULE ORAL
Qty: 90 CAP | Refills: 1 | Status: SHIPPED | OUTPATIENT
Start: 2019-10-29 | End: 2019-11-25 | Stop reason: SDUPTHER

## 2019-10-29 NOTE — PATIENT INSTRUCTIONS
Learning About the Low FODMAP Diet for Irritable Bowel Syndrome (IBS)  What is the low-FODMAP diet? A low-FODMAP diet is a way to find out what foods give you digestion problems. You stop eating certain high-FODMAP foods for about 2 months. Then you add them back to see how your body reacts. This is called a \"challenge diet. \" A dietitian or doctor can help you follow this diet. FODMAPs are carbohydrates. They are in many types of foods. FODMAP stands for:  · F ermentable. · O ligosaccharides. · D isaccharides. · M onosaccharides. · A nd p olyols. If you have digestive problems, some of these foods can make your symptoms worse. When you are on this diet, you can still eat certain fruits and vegetables. You can also eat certain grains, meats, fish, and lactose-free milks. What is it used for? If you have irritable bowel syndrome (IBS), you can ease your symptoms by not eating some types of foods. Some people also use this diet for inflammatory bowel disease (IBD) or some food intolerances. High-FODMAP foods can be hard to digest. They pull more fluid into your intestines. They are also easily fermented. This can lead to bloating, belly pain, gas, and diarrhea. The low-FODMAP diet can help you figure out what foods to avoid. And it can help you find foods that are easier to digest.  This diet can help with symptoms of some digestive diseases. But it's not a cure. You will still need to manage your condition. How does it work? You will work with a doctor or dietitian when you start the diet. At first, you won't eat any high-FODMAP foods for a few weeks. Go to [email or web address]. [employer] to learn more about this diet. Fabrice Greene also find links to an annabel for your phone or other device. You'll find low-FODMAP cookbooks there too. After 6 to 8 weeks, you will start to try high-FODMAP foods again. You will add those foods back to your diet, one group at a time.  Your doctor or dietitian will probably have you wait a few days before you add each new group of those foods. Keep a food diary. You can write down the foods you try and note how they make you feel. After a few weeks, you may have a better idea of what foods you should avoid and what foods make you feel your best.  What are the risks? There is some risk of not getting all of the vitamins and nutrients you need on the low-FODMAP diet. These include:  · Folate. · Thiamin. · Vitamin B6.  · Calcium. · Vitamin D. Your dietitian or doctor can help you find other sources of these if needed. This diet may limit your fiber intake. Try to plan your meals to include other sources of fiber. What foods are on the low-FODMAP diet? Here is a guide to foods that you can eat, plus the foods that you should avoid, when you are on the low-FODMAP diet. Grains  Okay to eat: Foods made from grains like arrowroot, buckwheat, corn, millet, and oats. You can also eat potato, quinoa, rice, sorghum, tapioca, and teff. Cereals, pasta, breads, corn tortillas and baked goods made from these grains are also okay. (These grains may be labeled \"gluten-free. \")  Avoid: Grains like wheat, barley, and rye. Avoid ingredients such as bulgur, couscous, durum, and semolina. And avoid cereals, breads, and pastas made from these grains. Avoid chickpea, lentil, and pea flour. Proteins  Okay to eat: Most meat, fish, and eggs without high-FODMAP sauces. You can have small amounts of almonds or hazelnuts (10 nuts). Macadamia nuts, peanuts, pecans, pine nuts, and walnuts are also okay. You can also eat erum and pumpkin seeds, tofu, and tempeh. Avoid: Beans, chickpeas, lentils, and soybeans. Avoid pistachio and cashew nuts. And some sausages may have high-FODMAP ingredients. Dairy  Okay to eat: Lactose-free dairy milks. Rice milk and almond milk are okay. So are lactose-free yogurts, kefirs, ice creams, and sorbet from low-FODMAP fruits and sweeteners. (These are often labeled \"lactose-free. \") You can have small amounts (2 Tbsp) of cottage, cream, or ricotta cheese. Hard cheeses like cheddar, [place], ROSS, and Swiss are okay. So are small amounts (1 oz) of aged or ripened cheeses like Brie, blue, and feta. Avoid: Milk, including cow, goat, and sheep. Avoid condensed or evaporated milk, buttermilk, custard, cream, sour cream, yogurt, and ice cream. Avoid soy milk. (Check sauces for dairy ingredients.)  Vegetables  Okay to eat: Bamboo shoots, bell peppers, bok davian, up to ½ cup of broccoli or cabbage (red or white), and cucumbers. Eggplant, green beans, lettuce, olives, parsnips, and potatoes are okay to eat. So are pumpkin, rutabaga, seaweed, sprouts, Swiss chard, and spinach. You can eat scallions (green part only) and squash (not butternut). You can eat tomatoes, turnips, watercress, yams, and zucchini. You can also have small amounts of artichoke hearts (from can, 1 oz), carrots, corn (½ cob), and sweet potato (½ cup). Avoid: Artichokes, asparagus, [place] sprouts, missy cabbage, cauliflower, and celery. And avoid garlic, leeks, mushrooms, okra, onions, scallions (white part), shallots, and peas. Fruits  Okay to eat: Bananas, blueberries, cantaloupe, coconut, grapes, and honeydew. Kiwi, bev, limes, oranges, passion fruit, papaya, and pineapple are also okay. You can eat plantain, raspberries, rhubarb, star fruit, strawberries, tangelo, and tangerine. You can also have small amounts of dried banana chips (up to 10 chips), dried cranberries (1 Tbsp), and shredded coconut (up to ¼ cup). Avoid: Apples, applesauce, apricots, avocados, blackberries, boysenberries, and cherries. Also avoid dates, figs, grapefruit, guava, lychee, and mangoes. Don't eat nectarines, peaches, pears, persimmon, plums, prunes, tamarillo, or watermelon. And limit most canned and dried fruits.   Oils, spices, condiments, and sweeteners  Okay to eat: Vegetable oils (including garlic infused), butter, ghee, lard, and margarine (no trans fat). You can have most fresh herbs like basil, chives, coriander, karo, parsley, rosemary and thyme. You can have salt, jams made from low-FODMAP fruits, mayonnaise, and mustard. Soy sauce, hot sauce (no garlic), tamari, and vinegar are also okay. Sweeteners that are okay include sugar (sucrose), powdered (confectioner's) sugar, brown sugar, glucose, and maple syrup. You can also have some artificial sweeteners like aspartame, saccharine, and stevia. Avoid: Chutneys, hummus, jellies, garlic sauces, and gravies made with onion or garlic. Avoid pickles, relish, some salad dressings and soup stocks, salsa, and tomato paste. And avoid sauces and other foods with high fructose corn syrup, honey, molasses, and agave. Avoid artificial sweeteners (isomalt, mannitol, malitol, sorbitol, and xylitol). Avoid corn syrup solids, fructose, fruit juice concentrate, and polydextrose. Other foods and drinks  Okay to have: Water, soda water, tonic, soft drinks sweetened with sugar, ½ cup of low-FODMAP fruit juice, and most teas and alcohols. You can also eat foods made with baking powder and soda, cocoa, and gelatin. Avoid: Juices from high-FODMAP fruits and vegetables. And avoid fortified dana, chamomile and fennel teas, chicory-based drinks and coffee substitutes, and bouillon cubes. Follow-up care is a key part of your treatment and safety. Be sure to make and go to all appointments, and call your doctor if you are having problems. It's also a good idea to know your test results and keep a list of the medicines you take. Where can you learn more? Go to http://hailey-long.info/. Enter L235 in the search box to learn more about \"Learning About the Low FODMAP Diet for Irritable Bowel Syndrome (IBS). \"  Current as of: November 7, 2018  Content Version: 12.2  © 7431-7469 [employer].  Care instructions adapted under license by [employer] (which disclaims liability or warranty for this information). If you have questions about a medical condition or this instruction, always ask your healthcare professional. [address] any warranty or liability for your use of this information. For EARS:   Debrox drops for wax, left ear. Flonase nasal spray 2 squirts each nostril 2-3 times daily. Vaccine Information Statement    Influenza (Flu) Vaccine (Inactivated or Recombinant): What You Need to Know    Many Vaccine Information Statements are available in [place] and other languages. See www.immunize.org/vis  Hojas de información sobre vacunas están disponibles en español y en muchos otros idiomas. Visite www.immunize.org/vis    1. Why get vaccinated? Influenza vaccine can prevent influenza (flu). Flu is a contagious disease that spreads around the [place] every year, usually between October and May. Anyone can get the flu, but it is more dangerous for some people. Infants and young children, people 72years of age and older, pregnant women, and people with certain health conditions or a weakened immune system are at greatest risk of flu complications. Pneumonia, bronchitis, sinus infections and ear infections are examples of flu-related complications. If you have a medical condition, such as heart disease, cancer or diabetes, flu can make it worse. Flu can cause fever and chills, sore throat, muscle aches, fatigue, cough, headache, and runny or stuffy nose. Some people may have vomiting and diarrhea, though this is more common in children than adults. Each year thousands of people in the [facility] die from flu, and many more are hospitalized. Flu vaccine prevents millions of illnesses and flu-related visits to the doctor each year. 2. Influenza vaccines     CDC recommends everyone 10months of age and older get vaccinated every flu season. Children 6 months through 6years of age may need 2 doses during a single flu season.   Everyone else needs only 1 dose each flu season. It takes about 2 weeks for protection to develop after vaccination. There are many flu viruses, and they are always changing. Each year a new flu vaccine is made to protect against three or four viruses that are likely to cause disease in the upcoming flu season. Even when the vaccine doesnt exactly match these viruses, it may still provide some protection. Influenza vaccine does not cause flu. Influenza vaccine may be given at the same time as other vaccines. 3. Talk with your health care provider    Tell your vaccine provider if the person getting the vaccine:   Has had an allergic reaction after a previous dose of influenza vaccine, or has any severe, life-threatening allergies.  Has ever had Guillain-Barré Syndrome (also called GBS). In some cases, your health care provider may decide to postpone influenza vaccination to a future visit. People with minor illnesses, such as a cold, may be vaccinated. People who are moderately or severely ill should usually wait until they recover before getting influenza vaccine. Your health care provider can give you more information. 4. Risks of a reaction     Soreness, redness, and swelling where shot is given, fever, muscle aches, and headache can happen after influenza vaccine.  There may be a very small increased risk of Guillain-Barré Syndrome (GBS) after inactivated influenza vaccine (the flu shot). Ashok Commander children who get the flu shot along with pneumococcal vaccine (PCV13), and/or DTaP vaccine at the same time might be slightly more likely to have a seizure caused by fever. Tell your health care provider if a child who is getting flu vaccine has ever had a seizure. People sometimes faint after medical procedures, including vaccination. Tell your provider if you feel dizzy or have vision changes or ringing in the ears.     As with any medicine, there is a very remote chance of a vaccine causing a severe allergic reaction, other serious injury, or death. 5. What if there is a serious problem? An allergic reaction could occur after the vaccinated person leaves the clinic. If you see signs of a severe allergic reaction (hives, swelling of the face and throat, difficulty breathing, a fast heartbeat, dizziness, or weakness), call 9-1-1 and get the person to the nearest hospital.    For other signs that concern you, call your health care provider. Adverse reactions should be reported to the Vaccine Adverse Event Reporting System (VAERS). Your health care provider will usually file this report, or you can do it yourself. Visit the VAERS website at www.vaers. hhs.gov or call 5-206.145.8452. VAERS is only for reporting reactions, and VAERS staff do not give medical advice. 6. The National Vaccine Injury Compensation Program    The [facility] Vaccine Injury Compensation Program (VICP) is a federal program that was created to compensate people who may have been injured by certain vaccines. Visit the VICP website at www.hrsa.gov/vaccinecompensation or call 6-242.463.9566 to learn about the program and about filing a claim. There is a time limit to file a claim for compensation. 7. How can I learn more?  Ask your health care provider.  Call your local or state health department.  Contact the Centers for Disease Control and Prevention (CDC):  - Call 3-883.604.7244 (1-800-CDC-INFO) or  - Visit CDCs influenza website at www.cdc.gov/flu    Vaccine Information Statement (Interim)  Inactivated Influenza Vaccine   8/15/2019  42 MARCIAL Saleem Samy [id number]   Department of Health and Human Services  Centers for Disease Control and Prevention    Office Use Only

## 2019-11-25 DIAGNOSIS — K58.0 IRRITABLE BOWEL SYNDROME WITH DIARRHEA: ICD-10-CM

## 2019-11-25 RX ORDER — CHLORDIAZEPOXIDE HYDROCHLORIDE AND CLIDINIUM BROMIDE 5; 2.5 MG/1; MG/1
1 CAPSULE ORAL
Qty: 90 CAP | Refills: 1 | Status: SHIPPED | OUTPATIENT
Start: 2019-11-25 | End: 2020-09-04 | Stop reason: ALTCHOICE

## 2020-01-02 ENCOUNTER — TELEPHONE (OUTPATIENT)
Dept: INTERNAL MEDICINE CLINIC | Age: 75
End: 2020-01-02

## 2020-01-02 RX ORDER — AZITHROMYCIN 250 MG/1
250 TABLET, FILM COATED ORAL SEE ADMIN INSTRUCTIONS
Qty: 6 TAB | Refills: 0 | Status: SHIPPED | OUTPATIENT
Start: 2020-01-02 | End: 2020-01-07

## 2020-01-02 NOTE — TELEPHONE ENCOUNTER
zpack send to pharm. Have her start Mucinex 1200mg bid (plain not D or DM) and saline nasal spray 2 squirts each nostril 2-3 times a day. Also Delsym cough syrup as needed for cough.

## 2020-01-02 NOTE — TELEPHONE ENCOUNTER
Caller's first and last name and relationship (if not the patient): n/a   Best contact number(s): [phone]   Whose call is being returned: Nurse   Details to clarify the request: Pt missed the call regarding her zpak prescription being filled before Friday 01/03/2020.  Pt is going out of town and needs it.      Chino Roman

## 2020-01-02 NOTE — TELEPHONE ENCOUNTER
Patient is calling back her symptoms include sore throat x 1 week w/ coughing yellow mucus, pt has taken Mucinex and she is leaving for [place] tomorrow she uses [facility] on file, a detailed message can be left

## 2020-02-20 ENCOUNTER — ANESTHESIA (OUTPATIENT)
Dept: ENDOSCOPY | Age: 75
End: 2020-02-20
Payer: MEDICARE

## 2020-02-20 ENCOUNTER — ANESTHESIA EVENT (OUTPATIENT)
Dept: ENDOSCOPY | Age: 75
End: 2020-02-20
Payer: MEDICARE

## 2020-02-20 ENCOUNTER — HOSPITAL ENCOUNTER (OUTPATIENT)
Age: 75
Setting detail: OUTPATIENT SURGERY
Discharge: HOME | End: 2020-02-20
Attending: INTERNAL MEDICINE | Admitting: INTERNAL MEDICINE
Payer: MEDICARE

## 2020-02-20 VITALS
OXYGEN SATURATION: 99 % | BODY MASS INDEX: 23.22 KG/M2 | HEART RATE: 60 BPM | RESPIRATION RATE: 13 BRPM | WEIGHT: 136 LBS | SYSTOLIC BLOOD PRESSURE: 105 MMHG | TEMPERATURE: 98.3 F | HEIGHT: 64 IN | DIASTOLIC BLOOD PRESSURE: 66 MMHG

## 2020-02-20 PROCEDURE — 74011250636 HC RX REV CODE- 250/636: Performed by: NURSE ANESTHETIST, CERTIFIED REGISTERED

## 2020-02-20 PROCEDURE — 77030021593 HC FCPS BIOP ENDOSC BSC -A: Performed by: INTERNAL MEDICINE

## 2020-02-20 PROCEDURE — 76040000019: Performed by: INTERNAL MEDICINE

## 2020-02-20 PROCEDURE — 74011000250 HC RX REV CODE- 250: Performed by: NURSE ANESTHETIST, CERTIFIED REGISTERED

## 2020-02-20 PROCEDURE — 88305 TISSUE EXAM BY PATHOLOGIST: CPT

## 2020-02-20 PROCEDURE — C1726 CATH, BAL DIL, NON-VASCULAR: HCPCS | Performed by: INTERNAL MEDICINE

## 2020-02-20 PROCEDURE — 76060000031 HC ANESTHESIA FIRST 0.5 HR: Performed by: INTERNAL MEDICINE

## 2020-02-20 RX ORDER — FLUMAZENIL 0.1 MG/ML
0.2 INJECTION INTRAVENOUS
Status: DISCONTINUED | OUTPATIENT
Start: 2020-02-20 | End: 2020-02-20 | Stop reason: HOSPADM

## 2020-02-20 RX ORDER — FENTANYL CITRATE 50 UG/ML
25-200 INJECTION, SOLUTION INTRAMUSCULAR; INTRAVENOUS
Status: DISCONTINUED | OUTPATIENT
Start: 2020-02-20 | End: 2020-02-20 | Stop reason: HOSPADM

## 2020-02-20 RX ORDER — NALOXONE HYDROCHLORIDE 0.4 MG/ML
0.4 INJECTION, SOLUTION INTRAMUSCULAR; INTRAVENOUS; SUBCUTANEOUS
Status: DISCONTINUED | OUTPATIENT
Start: 2020-02-20 | End: 2020-02-20 | Stop reason: HOSPADM

## 2020-02-20 RX ORDER — SODIUM CHLORIDE 0.9 % (FLUSH) 0.9 %
5-40 SYRINGE (ML) INJECTION AS NEEDED
Status: DISCONTINUED | OUTPATIENT
Start: 2020-02-20 | End: 2020-02-20 | Stop reason: HOSPADM

## 2020-02-20 RX ORDER — PANTOPRAZOLE SODIUM 40 MG/1
40 TABLET, DELAYED RELEASE ORAL
Qty: 90 TAB | Refills: 2 | Status: SHIPPED | OUTPATIENT
Start: 2020-02-20 | End: 2020-05-20

## 2020-02-20 RX ORDER — DEXTROMETHORPHAN/PSEUDOEPHED 2.5-7.5/.8
1.2 DROPS ORAL
Status: DISCONTINUED | OUTPATIENT
Start: 2020-02-20 | End: 2020-02-20 | Stop reason: HOSPADM

## 2020-02-20 RX ORDER — ATROPINE SULFATE 0.1 MG/ML
0.5 INJECTION INTRAVENOUS
Status: DISCONTINUED | OUTPATIENT
Start: 2020-02-20 | End: 2020-02-20 | Stop reason: HOSPADM

## 2020-02-20 RX ORDER — EPINEPHRINE 0.1 MG/ML
1 INJECTION INTRACARDIAC; INTRAVENOUS
Status: DISCONTINUED | OUTPATIENT
Start: 2020-02-20 | End: 2020-02-20 | Stop reason: HOSPADM

## 2020-02-20 RX ORDER — PROPOFOL 10 MG/ML
INJECTION, EMULSION INTRAVENOUS AS NEEDED
Status: DISCONTINUED | OUTPATIENT
Start: 2020-02-20 | End: 2020-02-20 | Stop reason: HOSPADM

## 2020-02-20 RX ORDER — LIDOCAINE HYDROCHLORIDE 20 MG/ML
INJECTION, SOLUTION INFILTRATION; PERINEURAL AS NEEDED
Status: DISCONTINUED | OUTPATIENT
Start: 2020-02-20 | End: 2020-02-20 | Stop reason: HOSPADM

## 2020-02-20 RX ORDER — MIDAZOLAM HYDROCHLORIDE 1 MG/ML
.25-5 INJECTION, SOLUTION INTRAMUSCULAR; INTRAVENOUS
Status: DISCONTINUED | OUTPATIENT
Start: 2020-02-20 | End: 2020-02-20 | Stop reason: HOSPADM

## 2020-02-20 RX ORDER — GLYCOPYRROLATE 0.2 MG/ML
INJECTION INTRAMUSCULAR; INTRAVENOUS AS NEEDED
Status: DISCONTINUED | OUTPATIENT
Start: 2020-02-20 | End: 2020-02-20 | Stop reason: HOSPADM

## 2020-02-20 RX ORDER — SODIUM CHLORIDE 0.9 % (FLUSH) 0.9 %
5-40 SYRINGE (ML) INJECTION EVERY 8 HOURS
Status: DISCONTINUED | OUTPATIENT
Start: 2020-02-20 | End: 2020-02-20 | Stop reason: HOSPADM

## 2020-02-20 RX ORDER — SODIUM CHLORIDE 9 MG/ML
50 INJECTION, SOLUTION INTRAVENOUS CONTINUOUS
Status: DISCONTINUED | OUTPATIENT
Start: 2020-02-20 | End: 2020-02-20 | Stop reason: HOSPADM

## 2020-02-20 RX ORDER — SODIUM CHLORIDE 9 MG/ML
INJECTION, SOLUTION INTRAVENOUS
Status: DISCONTINUED | OUTPATIENT
Start: 2020-02-20 | End: 2020-02-20 | Stop reason: HOSPADM

## 2020-02-20 RX ADMIN — PROPOFOL 20 MG: 10 INJECTION, EMULSION INTRAVENOUS at 17:03

## 2020-02-20 RX ADMIN — PROPOFOL 100 MG: 10 INJECTION, EMULSION INTRAVENOUS at 16:58

## 2020-02-20 RX ADMIN — LIDOCAINE HYDROCHLORIDE 100 MG: 20 INJECTION, SOLUTION INFILTRATION; PERINEURAL at 16:52

## 2020-02-20 RX ADMIN — PROPOFOL 20 MG: 10 INJECTION, EMULSION INTRAVENOUS at 17:01

## 2020-02-20 RX ADMIN — PROPOFOL 20 MG: 10 INJECTION, EMULSION INTRAVENOUS at 17:06

## 2020-02-20 RX ADMIN — PROPOFOL 50 MG: 10 INJECTION, EMULSION INTRAVENOUS at 17:00

## 2020-02-20 RX ADMIN — PROPOFOL 50 MG: 10 INJECTION, EMULSION INTRAVENOUS at 16:59

## 2020-02-20 RX ADMIN — PROPOFOL 30 MG: 10 INJECTION, EMULSION INTRAVENOUS at 17:04

## 2020-02-20 RX ADMIN — GLYCOPYRROLATE 0.2 MG: 0.2 INJECTION, SOLUTION INTRAMUSCULAR; INTRAVENOUS at 16:52

## 2020-02-20 RX ADMIN — SODIUM CHLORIDE: 900 INJECTION, SOLUTION INTRAVENOUS at 16:52

## 2020-02-20 NOTE — ROUTINE PROCESS
Ruddy Moore 1945 
[id number] Situation: 
Verbal report received from: Samantha Amoss Procedure: Procedure(s): ESOPHAGOGASTRODUODENOSCOPY (EGD) ESOPHAGEAL DILATION 
ESOPHAGOGASTRODUODENAL (EGD) BIOPSY Background: 
 
Preoperative diagnosis: DYSPHAGIA Postoperative diagnosis: 1. Gastritis 2. Gastric Erosions 3. Gastric Ulcers 4. Schatzki's Ring :  Dr. Christ Berrios Assistant(s): Endoscopy Technician-1: Tara Riley Endoscopy RN-1: Cheryl Pearson RN Specimens:  
ID Type Source Tests Collected by Time Destination 1 : Gastric Biopsies Preservative   Steve Soares MD 2/20/2020 1703 Pathology 2 : Random Esophagus Biopsies Preservative   Steve Soares MD 2/20/2020 1703 Pathology H. Pylori  no Assessment: 
Intra-procedure medications Anesthesia gave intra-procedure sedation and medications, see anesthesia flow sheet yes Intravenous fluids: NS@ Alex Zac Vital signs stable Abdominal assessment: round and soft Recommendation: 
Discharge patient per MD order. Family Permission to share finding with family or friend yes

## 2020-02-20 NOTE — PROCEDURES
[address]  [address] [facility], [address] [place] [address]        Esophago- Gastroduodenoscopy (EGD) Procedure Note    Chris Juarez  1945  [id number]      Procedure: Endoscopic Gastroduodenoscopy with biopsy, esophageal dilation    Indication:  Dysphagia/odynophagia     Pre-operative Diagnosis: see indication above    Post-operative Diagnosis: see findings below    : Martha Falk MD    Surgical Assistant: None    Implants:  None    Referring Provider:  Ansley Jones MD      Anesthesia/Sedation:  MAC anesthesia Propofol        Procedure Details     After infomed consent was obtained for the procedure, with all risks and benefits of procedure explained the patient was taken to the endoscopy suite and placed in the left lateral decubitus position. Following sequential administration of sedation as per above, the endoscope was inserted into the mouth and advanced under direct vision to third portion of the duodenum. A careful inspection was made as the gastroscope was withdrawn, including a retroflexed view of the proximal stomach; findings and interventions are described below. Findings:   Esophagus:schatzki ring at G-E junction, dilated with CRE balloon ( 15,16.5 then 18 mm), kept at 18 mm for 1 minute    Normal esophagus, random biopsies taken  Stomach: erosive gastritis in antrum with small non bleeding ulcers in antrum, biopsied, suspect NSAIDS induced  Duodenum: normal      Therapies:  As above    Specimens: as above         EBL: None      Complications:   None; patient tolerated the procedure well. Impression:    -See post-procedure diagnoses.     Recommendations:  -No NSAIDS, -f/u in 3 months  -protonix 40 mg/d for 3 months    Signed By: Martha Falk MD     2/20/2020  5:12 PM

## 2020-02-20 NOTE — DISCHARGE INSTRUCTIONS
[address]  [address] [facility], [address] [place] [address]    EGD DISCHARGE INSTRUCTIONS    Ruddy Moore  [id number]  1945    Discomfort:  Sore throat- throat lozenges or warm salt water gargle  redness at IV site- apply warm compress to area; if redness or soreness persist- contact your physician  Gaseous discomfort- walking, belching will help relieve any discomfort  You may not operate a vehicle for 12 hours  You may not engage in an occupation involving machinery or appliances for rest of today  You may not drink alcoholic beverages for at least 12 hours  Avoid making any critical decisions for at least 24 hour  DIET  You may resume your regular diet - however -  remember your colon is empty and a heavy meal will produce gas. Avoid these foods:  vegetables, fried / greasy foods, carbonated drinks    ACTIVITY  You may resume your normal daily activities   Spend the remainder of the day resting -  avoid any strenuous activity. CALL M.D. ANY SIGN OF   Increasing pain, nausea, vomiting  Abdominal distension (swelling)  New increased bleeding (oral or rectal)  Fever (chills)  Pain in chest area  Bloody discharge from nose or mouth  Shortness of breath    Follow-up Instructions:   Call Dr. Priscilla Ozuna for any questions or problems and follow up with him in 2 to 3 months  Telephone # [phone]  Take protonix for 3 months  Avoid NSAIDS    ENDOSCOPY FINDINGS:   Your endoscopy showed small ulcers in your stomach, not bleeding. You also have esophageal ring, dilated.     Signed By: Priscilla Ozuna MD     2/20/2020  5:16 PM

## 2020-02-20 NOTE — PROGRESS NOTES

## 2020-02-20 NOTE — H&P
The patient is a 69year old female who presents with a complaint of Diarrhea. The patient presents due to worsening symptoms. The diarrhea is characterized as acute and  is due to an unknown etiology. The stools are watery and loose and not formed. The onset of the diarrhea has been sudden and has been occurring in a persistent pattern for 6 weeks with a constant course. The frequency of bowel movements has been 3 per day. The symptoms have been associated with weight loss (10 pounds) and bloating, while the symptoms have not been associated with bleeding, abdominal pain, nausea or nocturnal bowel movements. The patient has been using dicyclomine (Bentyl) and other medications (Librax). Current medication use: not considered effective by patient. The patient had a colonoscopy 4 month(s) ago. Previous diagnostic tests have included stool studies, but not celiac panel. Lab results show: no recent lab studies. Note for \"Diarrhea\": She reports intermittent diarrhea for the past 6 years since trip to [facility]. She has history of IBS-D with worsening diarrhea over the past 6 weeks, mostly in the mornings. She typically gets better with Bentyl but this is not helping. She has gas and bloating, no pain but occasional discomfort. She was started on Librax 2 weeks ago which has not helped and makes her feel tired and slow affect. She notes kale and broccoli trigger symptoms. She has been on BRAT and low FODMAP diet without results. 2/13/2020: She completed 3 months of Entocort, finished about a week ago. She is doing much better, has 3-4 mostly solid bowel movements without urgency. She has eliminated tomatoes and red wine. She lost 15 pounds unintentionally. She stopped Align. She keeps Bentyl on hand but doesn't need it. She uses less NSAID's. She takes Tums for discomfort which is helpful. She highly recommends [place]. Problem List/Past Medical (Nicko Lawson; 2/13/2020 8:55 AM)  Osteoarthritis    Colonic Polyps    History of colon polyps (V12.72  Z86.010)    Constipation (564.00  K59.00)    Diarrhea (787.91  R19.7)   Colonoscopy 7/2019: grade 2 internal hemorrhoids; lymphocytic colitis 2015 11/2019 stool studies negative, elastase 232  Lymphocytic colitis (558.9  K52.832)    Rectal/anal hemorrhage (569.3  K62.5)    Abdominal swelling, generalized (789.37  R19.07)    Non-Contributory Problem List/Past Medical History    Problems Reconciled      Past Surgical History (Navid Lawson; 2/13/2020 8:55 AM)  Polypectomy    Rotator Cuff Surgery   (Marked as Inactive)  ACL Recunstrution   (Marked as Inactive)  Wrist Recunstruction   (Marked as Inactive)  S/P ACL repair (V45.89  Z98.890)   Right. WRIST RECONSTRUCTION (63586)   Right. Rotator Cuff Repair - Right    Non-Contributory Past Surgical History      Allergies Edil Lawson; 2/13/2020 8:55 AM)  Penicillins    Darvocet A500 *ANALGESICS - OPIOID*    Percocet *ANALGESICS - OPIOID*    Allergies Reconciled      Medication History (Navid Lawson; 2/13/2020 8:55 AM)  Entocort EC  (3MG Capsule DR Part, 3 (three) Capsule Oral daily, Taken starting 01/07/2020) Active. Budesonide ER  (9MG Tablet ER 24HR, 1 (one) Tablet Oral daily, Taken starting 11/21/2019) Active. Magnesium  (Oral daily) Specific strength unknown - Active. Naprosyn  (500MG Tablet, Oral as directed) Active. Multivitamin  (Oral 1 po qd) Active. Fish Oil  (1200MG Capsule, Oral 1 po qd) Active. Claritin  (10MG Capsule, Oral 1 po qd) Active. Vitamin D  (2000UNIT Tablet, Oral daily) Active. Clorazepate Dipotassium  (7.5MG Tablet, Oral) Active. (pt not sure of mg)  Align  (Oral daily) Active. Medications Reconciled     Family History (Navid Lawson; 2/13/2020 8:55 AM)  Breast Cancer   First Degree Relatives. Celiac Sprue   Brother, Daughter, First Degree Relatives. Non-Contributory Family History      Social History (Navid Lawson; 2/13/2020 8:55 AM)  Blood Transfusion   No.  Employment status   Retired. Tobacco Use   Never smoker. Marital status   . Alcohol Use   Occasional alcohol use. Non-Contributory Social History      Diagnostic Studies History Mathieu Idler RADHA Lawson; 2/13/2020 8:55 AM)  Endoscopy    Colonoscopy    Non-Contributory Diagnostic Studies History      Health Maintenance History (Karlyn Meckel. Meekins; 2/13/2020 8:55 AM)  Flu Vaccine   Date: 10/2015. Pneumovax   none  Non-Contributory Health Maintenance History          Review of Systems (Karlyn Meckel. Meekins; 2/13/2020 8:55 AM)  General Not Present- Chronic Fatigue, Poor Appetite, Weight Gain and Weight Loss. Skin Not Present- Itching, Rash and Skin Color Changes. HEENT Not Present- Hearing Loss and Vertigo. Respiratory Not Present- Difficulty Breathing and TB exposure. Cardiovascular Not Present- Chest Pain, Use of Antibiotics before Dental Procedures and Use of Blood Thinners. Gastrointestinal Present- See HPI. Musculoskeletal Not Present- Arthritis, Hip Replacement Surgery and Knee Replacement Surgery. Neurological Not Present- Weakness. Psychiatric Not Present- Depression. Endocrine Not Present- Diabetes and Thyroid Problems. Hematology Not Present- Anemia. Vitals (Karlyn Meckel. Meekins; 2/13/2020 8:56 AM)  2/13/2020 8:56 AM  Weight: 138.25 lb   Height: 64 in   Body Surface Area: 1.67 m²   Body Mass Index: 23.73 kg/m²    Pulse: 61 (Regular)     BP: 106/69 (Sitting, Left Arm, Standard)              Physical Exam Jessica Estelle RADHA Cabral AGACNP; 2/13/2020 9:41 AM)  General  Mental Status - Alert. General Appearance - Cooperative, Pleasant, Not in acute distress. Orientation - Oriented X3. Build & Nutrition - Well nourished and Well developed. Integumentary  General Characteristics  Color - normal coloration of skin. Temperature - normal warmth is noted. Mobility & Turgor - normal mobility and turgor. Head and Neck  Head - normocephalic, atraumatic with no lesions or palpable masses.   Neck  Global Assessment - full range of motion and supple. Trachea - midline. Eye  Eyeball - Bilateral - No Exophthalmos. Sclera/Conjunctiva - Left - No Jaundice. Sclera/Conjunctiva - Right - No Jaundice. Chest and Lung Exam  Chest and lung exam reveals  - quiet, even and easy respiratory effort with no use of accessory muscles. Auscultation  Breath sounds - Normal. Adventitious sounds - No Adventitious sounds. Cardiovascular  Auscultation  Rhythm - Regular, No Tachycardia, No Bradycardia . Heart Sounds - Normal heart sounds , S1 WNL and S2 WNL, No S3, No Summation Gallop. Murmurs & Other Heart Sounds - Auscultation of the heart reveals - No Murmurs. Abdomen  Inspection  Inspection of the abdomen reveals - Soft. Palpation/Percussion  Tenderness - Non-Tender. Rebound tenderness - No rebound. Rigidity (guarding) - No Rigidity. Dullness to percussion - No abnormal dullness to percussion. Liver - No hepatosplenomegaly. Abdominal Mass Palpable - No masses. Other Characteristics - No Ascites. Auscultation  Auscultation of the abdomen reveals - Bowel sounds normal, No Abdominal bruits and No Succussion splash. Rectal - Did not examine. Peripheral Vascular  Upper Extremity  Inspection - Left - Normal - No Clubbing, No Cyanosis, No Edema, Pulses Intact. Right - Normal - No Clubbing, No Cyanosis, No Edema, Pulses Intact. Palpation - Edema - Left - No edema. Right - No edema. Neurologic  Neurologic evaluation reveals  - Cranial nerves grossly intact, no focal neurologic deficits. Musculoskeletal  Global Assessment  Gait and Station - normal gait and station. Assessment & Plan (Mj Gaspar. [facility]; 2/13/2020 9:46 AM)  Diarrhea (787.91  R19.7)  Story: Colonoscopy 7/2019: grade 2 internal hemorrhoids; lymphocytic colitis 2015  11/2019 stool studies negative, elastase 232  Impression: Her diarrhea greatly improved after 2 months of Entocort. She is feeling much better. She lost ~ 15 pounds.  If she is unable to gain weight or looses more weight, recommended pancreatic enzymes. Dysphagia (787.20  R13.10)  Impression: She reports chronic dysphagia to dry foods, worsening lately, never had an EGD. Current Plans  Pt Education - How to access health information online: discussed with patient and provided information. Endoscopy (39611) (Discussed risks and benefits with the patient to include: perforation,or post biopsy bleeding, missed lesions, and sedation reactions.)  Patient is to call me for any questions or concerns. Follow up office visit PRN  This patient was reviewed and seen in collaboration with Dr. Sebas Pedroza. Date of Surgery Update: Vania Skelton was seen and examined. History and physical has been reviewed. The patient has been examined.  There have been no significant clinical changes since the completion of the originally dated History and Physical.    Signed By: Sebas Pedroza MD     February 20, 2020 4:50 PM

## 2020-02-20 NOTE — ANESTHESIA POSTPROCEDURE EVALUATION
Post-Anesthesia Evaluation and Assessment    Patient: Mary Kingsley MRN: [id number]  SSN: [id number]    YOB: 1945  Age: 77 y.o. Sex: female      I have evaluated the patient and they are stable and ready for discharge from the PACU. Cardiovascular Function/Vital Signs  Visit Vitals  BP (!) 85/54   Pulse 63   Temp 36.8 °C (98.3 °F)   Resp 16   Ht 5' 4\" (1.626 m)   Wt 61.7 kg (136 lb)   SpO2 91%   BMI 23.34 kg/m²       Patient is status post MAC anesthesia for Procedure(s):  ESOPHAGOGASTRODUODENOSCOPY (EGD)  ESOPHAGEAL DILATION  ESOPHAGOGASTRODUODENAL (EGD) BIOPSY. Nausea/Vomiting: None    Postoperative hydration reviewed and adequate. Pain:  Pain Scale 1: Visual (02/20/20 1714)  Pain Intensity 1: 0 (02/20/20 1714)   Managed    Neurological Status: At baseline    Mental Status, Level of Consciousness: Alert and  oriented to person, place, and time    Pulmonary Status:   O2 Device: Room air (02/20/20 1714)   Adequate oxygenation and airway patent    Complications related to anesthesia: None    Post-anesthesia assessment completed. No concerns    Signed By: Consuelo Woodson MD     February 20, 2020              Procedure(s):  ESOPHAGOGASTRODUODENOSCOPY (EGD)  ESOPHAGEAL DILATION  ESOPHAGOGASTRODUODENAL (EGD) BIOPSY. MAC    <BSHSIANPOST>    Vitals Value Taken Time   BP 87/57 2/20/2020  5:24 PM   Temp     Pulse 55 2/20/2020  5:30 PM   Resp 17 2/20/2020  5:30 PM   SpO2 96 % 2/20/2020  5:30 PM   Vitals shown include unvalidated device data.

## 2020-05-05 ENCOUNTER — TELEPHONE (OUTPATIENT)
Dept: INTERNAL MEDICINE CLINIC | Age: 75
End: 2020-05-05

## 2020-05-05 DIAGNOSIS — Z01.818 PREOP EXAMINATION: Primary | ICD-10-CM

## 2020-05-07 ENCOUNTER — HOSPITAL ENCOUNTER (OUTPATIENT)
Dept: NON INVASIVE DIAGNOSTICS | Age: 75
Discharge: HOME | End: 2020-05-07
Payer: MEDICARE

## 2020-05-07 DIAGNOSIS — Z01.818 PREOP EXAMINATION: ICD-10-CM

## 2020-05-07 PROCEDURE — 93005 ELECTROCARDIOGRAM TRACING: CPT

## 2020-05-09 LAB
ATRIAL RATE: 59 BPM
CALCULATED P AXIS, ECG09: 68 DEGREES
CALCULATED R AXIS, ECG10: 63 DEGREES
CALCULATED T AXIS, ECG11: 55 DEGREES
DIAGNOSIS, 93000: NORMAL
P-R INTERVAL, ECG05: 196 MS
Q-T INTERVAL, ECG07: 416 MS
QRS DURATION, ECG06: 86 MS
QTC CALCULATION (BEZET), ECG08: 411 MS
VENTRICULAR RATE, ECG03: 59 BPM

## 2020-07-12 RX ORDER — ALENDRONATE SODIUM 70 MG/1
TABLET ORAL
Qty: 12 TAB | Refills: 3 | Status: SHIPPED | OUTPATIENT
Start: 2020-07-12 | End: 2021-12-21 | Stop reason: ALTCHOICE

## 2020-09-04 ENCOUNTER — OFFICE VISIT (OUTPATIENT)
Dept: INTERNAL MEDICINE CLINIC | Age: 75
End: 2020-09-04
Payer: MEDICARE

## 2020-09-04 VITALS
DIASTOLIC BLOOD PRESSURE: 64 MMHG | RESPIRATION RATE: 20 BRPM | BODY MASS INDEX: 22.5 KG/M2 | TEMPERATURE: 97.1 F | HEART RATE: 57 BPM | WEIGHT: 127 LBS | SYSTOLIC BLOOD PRESSURE: 113 MMHG | OXYGEN SATURATION: 98 % | HEIGHT: 63 IN

## 2020-09-04 DIAGNOSIS — E78.00 PURE HYPERCHOLESTEROLEMIA: ICD-10-CM

## 2020-09-04 DIAGNOSIS — K58.0 IRRITABLE BOWEL SYNDROME WITH DIARRHEA: ICD-10-CM

## 2020-09-04 DIAGNOSIS — M25.532 PAIN IN BOTH WRISTS: ICD-10-CM

## 2020-09-04 DIAGNOSIS — Z71.89 ADVANCE DIRECTIVE DISCUSSED WITH PATIENT: ICD-10-CM

## 2020-09-04 DIAGNOSIS — M25.531 PAIN IN BOTH WRISTS: ICD-10-CM

## 2020-09-04 DIAGNOSIS — C44.311 BASAL CELL CARCINOMA (BCC) OF SKIN OF NOSE: ICD-10-CM

## 2020-09-04 DIAGNOSIS — K27.9 PUD (PEPTIC ULCER DISEASE): ICD-10-CM

## 2020-09-04 DIAGNOSIS — Z00.00 MEDICARE ANNUAL WELLNESS VISIT, SUBSEQUENT: Primary | ICD-10-CM

## 2020-09-04 DIAGNOSIS — M17.0 PRIMARY OSTEOARTHRITIS OF BOTH KNEES: ICD-10-CM

## 2020-09-04 DIAGNOSIS — E55.9 VITAMIN D DEFICIENCY: ICD-10-CM

## 2020-09-04 PROCEDURE — G8399 PT W/DXA RESULTS DOCUMENT: HCPCS | Performed by: INTERNAL MEDICINE

## 2020-09-04 PROCEDURE — G8427 DOCREV CUR MEDS BY ELIG CLIN: HCPCS | Performed by: INTERNAL MEDICINE

## 2020-09-04 PROCEDURE — G8420 CALC BMI NORM PARAMETERS: HCPCS | Performed by: INTERNAL MEDICINE

## 2020-09-04 PROCEDURE — 3017F COLORECTAL CA SCREEN DOC REV: CPT | Performed by: INTERNAL MEDICINE

## 2020-09-04 PROCEDURE — G0439 PPPS, SUBSEQ VISIT: HCPCS | Performed by: INTERNAL MEDICINE

## 2020-09-04 PROCEDURE — G0444 DEPRESSION SCREEN ANNUAL: HCPCS | Performed by: INTERNAL MEDICINE

## 2020-09-04 PROCEDURE — 1100F PTFALLS ASSESS-DOCD GE2>/YR: CPT | Performed by: INTERNAL MEDICINE

## 2020-09-04 PROCEDURE — G8510 SCR DEP NEG, NO PLAN REQD: HCPCS | Performed by: INTERNAL MEDICINE

## 2020-09-04 PROCEDURE — 3288F FALL RISK ASSESSMENT DOCD: CPT | Performed by: INTERNAL MEDICINE

## 2020-09-04 PROCEDURE — G9899 SCRN MAM PERF RSLTS DOC: HCPCS | Performed by: INTERNAL MEDICINE

## 2020-09-04 PROCEDURE — G8536 NO DOC ELDER MAL SCRN: HCPCS | Performed by: INTERNAL MEDICINE

## 2020-09-04 RX ORDER — ACETAMINOPHEN 500 MG
1000 TABLET ORAL 2 TIMES DAILY
COMMUNITY

## 2020-09-04 RX ORDER — PANTOPRAZOLE SODIUM 40 MG/1
TABLET, DELAYED RELEASE ORAL
COMMUNITY
Start: 2020-08-12 | End: 2021-12-21 | Stop reason: ALTCHOICE

## 2020-09-04 NOTE — PROGRESS NOTES
This is the Subsequent Medicare Annual Wellness Exam, performed 12 months or more after the Initial AWV or the last Subsequent AWV    I have reviewed the patient's medical history in detail and updated the computerized patient record. Had EGD since last visit showing a collection of small ulcerations in her stomach. Also had expansion of esophageal stricture which helped. Continued IBS-D. Wonders about seeing nutritionist to discuss FODMAP diet. Has stopped coffee and tea. Currently on Pantoprazole and Bentyl. Significant weight loss of 20-30 lbs - stabilized because forcing herself to eat. Had to stop NSAIDS. Had increased left shoulder pain. Had reconstruction of left shoulder through Dr. Rodolfo Cuellar. Doing well. D/Andrew from PT. Chip and putt only for now but now able to start from 100 yards and slowly working backwards. Also had increased bilat wrist and knee pain. Had cortisone injections which helped only temporarily. Symvisc did not help. Tried Tylenol which helps some. May be moving towards knee surgery.    Needs a dermatologist.      History     Patient Active Problem List   Diagnosis Code    Allergic dermatitis due to poison ivy L23.7    Vertigo, benign paroxysmal H81.10    Migraine G43.909    Hx of basal cell carcinoma Z85.828    History of colon polyps Z86.010    Esophageal spasm K22.4    Advance directive discussed with patient Z71.89    Chronic bilateral low back pain with right-sided sciatica M54.41, G89.29    PUD (peptic ulcer disease) K27.9    Irritable bowel syndrome with diarrhea K58.0    Primary osteoarthritis of both knees M17.0     Past Medical History:   Diagnosis Date    Colon polyps 1996    Esophageal spasm     Migraine     Osteopenia     Vertigo       Past Surgical History:   Procedure Laterality Date    COLONOSCOPY N/A 7/2/2019    COLONOSCOPY performed by Kayla Riggs MD at [facility], DIAGNOSTIC  2012    negative; 4 th one  HX ORTHOPAEDIC Right 2001    r knee ACL, removal of loose body    HX ORTHOPAEDIC Right 1995    R wrist surgery after carpal fractures     HX ORTHOPAEDIC Right 9-2014    rotator cuff repair/ reattached biceps with screw     Current Outpatient Medications   Medication Sig Dispense Refill    pantoprazole (PROTONIX) 40 mg tablet TAKE 1 TABLET BY MOUTH EVERY DAY BEFORE BREAKFAST      acetaminophen (Tylenol Extra Strength) 500 mg tablet Take 1,000 mg by mouth two (2) times a day.  alendronate (FOSAMAX) 70 mg tablet TAKE 1 TABLET BY MOUTH ONCE A WEEK 12 Tab 3    dicyclomine (BENTYL) 10 mg capsule TAKE ONE CAPSULE BY MOUTH 3 TIMES A DAY AS NEEDED ABDOMINAL PAIN OR DIARRHEA 30 Cap 3    Magnesium Oxide 500 mg cap Take  by mouth.  calcium-cholecalciferol, d3, 600-125 mg-unit tab Take  by mouth.  Cholecalciferol, Vitamin D3, (VITAMIN D3) 1,000 unit cap Take 1 Cap by mouth daily.  omega-3 fatty acids-vitamin e (FISH OIL) 1,000 mg cap Take 1 Cap by mouth.  loratadine (CLARITIN) 10 mg tablet Take 1 Tab by mouth daily. 30 Tab 3    multivitamin (ONE A DAY) tablet Take 1 Tab by mouth daily. Allergies   Allergen Reactions    [de-identified] A500 [Propoxyphene N-Acetaminophen] Other (comments)     headache    Flexeril [Cyclobenzaprine] Vertigo    Pcn [Penicillins] Hives    Percocet [Oxycodone-Acetaminophen] Other (comments)     Headache - severe    Poison Ivy [Vit E-Nonoxynol 9-Aloe Vera] Rash     Severe allergy       Family History   Problem Relation Age of Onset   Vonpatriciae Searing Hypertension Mother     Hypertension Father     MS Daughter     Other Brother         celiac sprue    Breast Cancer Paternal Grandmother         Age Late 60's/Early 66's     Social History     Tobacco Use    Smoking status: Never Smoker    Smokeless tobacco: Never Used   Substance Use Topics    Alcohol use:  Yes     Alcohol/week: 8.0 standard drinks     Types: 1 Glasses of wine, 7 Standard drinks or equivalent per week Comment: 1 at dinner       Depression Risk Factor Screening:     3 most recent PHQ Screens 9/4/2020   Little interest or pleasure in doing things Not at all   Feeling down, depressed, irritable, or hopeless Not at all   Total Score PHQ 2 0       Alcohol Risk Factor Screening:   Do you average 1 drink per night or more than 7 drinks a week:  Yes    On any one occasion in the past three months have you have had more than 3 drinks containing alcohol:  No      Functional Ability and Level of Safety:   Hearing: Hearing is good. Activities of Daily Living: The home contains: no safety equipment. Patient does total self care     Ambulation: with no difficulty     Fall Risk:  Fall Risk Assessment, last 12 mths 10/29/2019   Able to walk? Yes   Fall in past 12 months? Yes   Fall with injury? No   Number of falls in past 12 months 1   Fall Risk Score 1     Abuse Screen:  Patient is not abused       Cognitive Screening   Has your family/caregiver stated any concerns about your memory: no     Cognitive Screening: Normal -      Patient Care Team   Patient Care Team:  Sylvia Kilgore MD as PCP - General (Internal Medicine)  Sylvia Kilgore MD as PCP - [address] [place] [address] Provider  Ada Pereira MD (Obstetrics & Gynecology)  Fracisco Gomez MD (Ophthalmology)  Matt Nava MD (Dermatology)  Lux Naranjo MD (Gastroenterology)    Assessment/Plan   Education and counseling provided:  Are appropriate based on today's review and evaluation   Advanced directive discussed with patient. Diagnoses and all orders for this visit:    1. Medicare annual wellness visit, subsequent    2. Basal cell carcinoma (BCC) of skin of nose  -     REFERRAL TO DERMATOLOGY    3. Pure hypercholesterolemia  -     METABOLIC PANEL, COMPREHENSIVE; Future  -     LIPID PANEL; Future    4. Vitamin D deficiency  -     VITAMIN D, 25 HYDROXY; Future    5.  Pain in both wrists  -     RHEUMATOID FACTOR, QT; Future  -     SED RATE (ESR); Future    6. Advance directive discussed with patient    7. Primary osteoarthritis of both knees    8. PUD (peptic ulcer disease)    9.  Irritable bowel syndrome with diarrhea        Health Maintenance Due   Topic Date Due    Medicare Yearly Exam  09/04/2021    GLAUCOMA SCREENING Q2Y  Up to date    Flu Vaccine (1) 09/01/2020

## 2020-09-04 NOTE — PATIENT INSTRUCTIONS
Medicare Wellness Visit, Female The best way to live healthy is to have a lifestyle where you eat a well-balanced diet, exercise regularly, limit alcohol use, and quit all forms of tobacco/nicotine, if applicable. Regular preventive services are another way to keep healthy. Preventive services (vaccines, screening tests, monitoring & exams) can help personalize your care plan, which helps you manage your own care. Screening tests can find health problems at the earliest stages, when they are easiest to treat. [facility] follows the current, evidence-based guidelines published by the [facility] ([facility]) when recommending preventive services for our patients. Because we follow these guidelines, sometimes recommendations change over time as research supports it. (For example, mammograms used to be recommended annually. Even though Medicare will still pay for an annual mammogram, the newer guidelines recommend a mammogram every two years for women of average risk). Of course, you and your doctor may decide to screen more often for some diseases, based on your risk and your co-morbidities (chronic disease you are already diagnosed with). Preventive services for you include: - Medicare offers their members a free annual wellness visit, which is time for you and your primary care provider to discuss and plan for your preventive service needs. Take advantage of this benefit every year! 
-All adults over the age of 72 should receive the recommended pneumonia vaccines. Current USPSTF guidelines recommend a series of two vaccines for the best pneumonia protection.  
-All adults should have a flu vaccine yearly and a tetanus vaccine every 10 years.  
-All adults age 48 and older should receive the shingles vaccines (series of two vaccines). -All adults age 38-68 who are overweight should have a diabetes screening test once every three years. -All adults born between [birth year] and 1965 should be screened once for Hepatitis C. 
-Other screening tests and preventive services for persons with diabetes include: an eye exam to screen for diabetic retinopathy, a kidney function test, a foot exam, and stricter control over your cholesterol.  
-Cardiovascular screening for adults with routine risk involves an electrocardiogram (ECG) at intervals determined by your doctor.  
-Colorectal cancer screenings should be done for adults age 54-65 with no increased risk factors for colorectal cancer. There are a number of acceptable methods of screening for this type of cancer. Each test has its own benefits and drawbacks. Discuss with your doctor what is most appropriate for you during your annual wellness visit. The different tests include: colonoscopy (considered the best screening method), a fecal occult blood test, a fecal DNA test, and sigmoidoscopy. 
 
-A bone mass density test is recommended when a woman turns 65 to screen for osteoporosis. This test is only recommended one time, as a screening. Some providers will use this same test as a disease monitoring tool if you already have osteoporosis. -Breast cancer screenings are recommended every other year for women of normal risk, age 54-69. 
-Cervical cancer screenings for women over age 72 are only recommended with certain risk factors. Here is a list of your current Health Maintenance items (your personalized list of preventive services) with a due date: 
 
 
Health Maintenance Due Topic Date Due  Medicare Yearly Exam  09/04/2021  GLAUCOMA SCREENING Q2Y  Up to date  Flu Vaccine (1) 09/01/2020

## 2020-09-09 LAB
25(OH)D3 SERPL-MCNC: 47.2 NG/ML (ref 30–100)
ALBUMIN SERPL-MCNC: 3.7 G/DL (ref 3.5–5)
ALBUMIN/GLOB SERPL: 1.1 {RATIO} (ref 1.1–2.2)
ALP SERPL-CCNC: 60 U/L (ref 45–117)
ALT SERPL-CCNC: 25 U/L (ref 12–78)
ANION GAP SERPL CALC-SCNC: 4 MMOL/L (ref 5–15)
AST SERPL-CCNC: 24 U/L (ref 15–37)
BILIRUB SERPL-MCNC: 0.5 MG/DL (ref 0.2–1)
BUN SERPL-MCNC: 16 MG/DL (ref 6–20)
BUN/CREAT SERPL: 23 (ref 12–20)
CALCIUM SERPL-MCNC: 9 MG/DL (ref 8.5–10.1)
CHLORIDE SERPL-SCNC: 106 MMOL/L (ref 97–108)
CHOLEST SERPL-MCNC: 176 MG/DL
CO2 SERPL-SCNC: 30 MMOL/L (ref 21–32)
CREAT SERPL-MCNC: 0.7 MG/DL (ref 0.55–1.02)
ERYTHROCYTE [SEDIMENTATION RATE] IN BLOOD: 13 MM/HR (ref 0–30)
GLOBULIN SER CALC-MCNC: 3.3 G/DL (ref 2–4)
GLUCOSE SERPL-MCNC: 103 MG/DL (ref 65–100)
HDLC SERPL-MCNC: 63 MG/DL
HDLC SERPL: 2.8 {RATIO} (ref 0–5)
LDLC SERPL CALC-MCNC: 90 MG/DL (ref 0–100)
LIPID PROFILE,FLP: NORMAL
POTASSIUM SERPL-SCNC: 4.2 MMOL/L (ref 3.5–5.1)
PROT SERPL-MCNC: 7 G/DL (ref 6.4–8.2)
RHEUMATOID FACT SERPL-ACNC: <10 IU/ML
SODIUM SERPL-SCNC: 140 MMOL/L (ref 136–145)
TRIGL SERPL-MCNC: 115 MG/DL (ref ?–150)
VLDLC SERPL CALC-MCNC: 23 MG/DL

## 2020-10-22 ENCOUNTER — TELEPHONE (OUTPATIENT)
Dept: INTERNAL MEDICINE CLINIC | Age: 75
End: 2020-10-22

## 2020-10-22 NOTE — TELEPHONE ENCOUNTER
Patient  Saw a dietician and was told that her PCP needed to order these test.    Gluten intolerance test      HLADQ4  [facility]

## 2020-10-28 ENCOUNTER — TELEPHONE (OUTPATIENT)
Dept: INTERNAL MEDICINE CLINIC | Age: 75
End: 2020-10-28

## 2020-10-28 DIAGNOSIS — R19.7 DIARRHEA, UNSPECIFIED TYPE: Primary | ICD-10-CM

## 2020-10-28 DIAGNOSIS — Z83.79 FH: CELIAC DISEASE: ICD-10-CM

## 2020-10-28 NOTE — TELEPHONE ENCOUNTER
----- Message from Andrew Dylan sent at 10/28/2020 11:07 AM EDT -----  Regarding: FW: Non-Urgent Medical Question  Contact: [phone]    ----- Message -----  From: Chantelle Otero  Sent: 10/28/2020  10:59 AM EDT  To: [facility] [place]  Subject: Non-Urgent Medical Question                      Following your suggestion, I have seen Otto Marshall  in order to get her advice  concerning the management of my IBS. She requested two tests for gluten intolerance: HLADQ4 and HLADR8. Since my older brother has severe Celiac  Sprew \"?\", his son is an moderate celiac, and my daughter is a mild celiac, she feels that it I may also have the same. To my knowledge I have not been tested for gluten intolerance. Could you please give me an order for these 2 test?   Thank you for your help.   Chantelle Otero

## 2020-11-02 DIAGNOSIS — R19.7 DIARRHEA, UNSPECIFIED TYPE: ICD-10-CM

## 2020-11-02 DIAGNOSIS — Z83.79 FH: CELIAC DISEASE: ICD-10-CM

## 2020-11-03 LAB
GLIADIN PEPTIDE IGA SER-ACNC: 16 UNITS (ref 0–19)
GLIADIN PEPTIDE IGG SER-ACNC: 3 UNITS (ref 0–19)
IGA SERPL-MCNC: 215 MG/DL (ref 64–422)
TTG IGA SER-ACNC: <2 U/ML (ref 0–3)
TTG IGG SER-ACNC: 4 U/ML (ref 0–5)

## 2020-12-16 ENCOUNTER — TELEPHONE (OUTPATIENT)
Dept: INTERNAL MEDICINE CLINIC | Age: 75
End: 2020-12-16

## 2020-12-16 NOTE — TELEPHONE ENCOUNTER
Covid-19 positive test           Best contact number(s):[phone]       Patient has tested positive for Covid-19 on Monday and would like Dr. Mingo Calderón advice on what to do next. Fredis Stalinshyanne family will be coming to see her this weekend for Bo.      [facility]

## 2020-12-16 NOTE — TELEPHONE ENCOUNTER
Feels fine, had COVID test through [facility] on Monday which was +, found out today. discussed quarantine until Monday 12/28 (14 days after + test). Call if symptoms develop.

## 2021-01-04 ENCOUNTER — HOSPITAL ENCOUNTER (OUTPATIENT)
Dept: LAB | Age: 76
Discharge: HOME | End: 2021-01-04
Payer: MEDICARE

## 2021-01-04 ENCOUNTER — OFFICE VISIT (OUTPATIENT)
Dept: INTERNAL MEDICINE CLINIC | Age: 76
End: 2021-01-04
Payer: MEDICARE

## 2021-01-04 DIAGNOSIS — U07.1 COVID-19: Primary | ICD-10-CM

## 2021-01-04 PROCEDURE — 87635 SARS-COV-2 COVID-19 AMP PRB: CPT

## 2021-01-04 PROCEDURE — 99213 OFFICE O/P EST LOW 20 MIN: CPT | Performed by: INTERNAL MEDICINE

## 2021-01-04 PROCEDURE — U0003 INFECTIOUS AGENT DETECTION BY NUCLEIC ACID (DNA OR RNA); SEVERE ACUTE RESPIRATORY SYNDROME CORONAVIRUS 2 (SARS-COV-2) (CORONAVIRUS DISEASE [COVID-19]), AMPLIFIED PROBE TECHNIQUE, MAKING USE OF HIGH THROUGHPUT TECHNOLOGIES AS DESCRIBED BY CMS-2020-01-R: HCPCS

## 2021-01-04 PROCEDURE — U0005 INFEC AGEN DETEC AMPLI PROBE: HCPCS

## 2021-01-04 NOTE — PROGRESS NOTES
HISTORY OF PRESENT ILLNESS  Vania Khanna is a 77 y.o. female. HPI  Here for COVID testing. Traveling to [place] later this week and by [place] law needs negative COVID test.  Tested + for COVID on 12/14/2020. No previous exposures and denies any symptoms. Denies chest pain, sob, cough, fevers, chills, myalgias, headaches, n/v, diarrhea, abdominal upset, change in sense of smell or taste. + fatigue but ongoing in the last month post left shoulder surgery.  tested negative for COVID on 12/14 and has also had no symptoms. Current Outpatient Medications:     pantoprazole (PROTONIX) 40 mg tablet, TAKE 1 TABLET BY MOUTH EVERY DAY BEFORE BREAKFAST, Disp: , Rfl:     acetaminophen (Tylenol Extra Strength) 500 mg tablet, Take 1,000 mg by mouth two (2) times a day., Disp: , Rfl:     alendronate (FOSAMAX) 70 mg tablet, TAKE 1 TABLET BY MOUTH ONCE A WEEK, Disp: 12 Tab, Rfl: 3    dicyclomine (BENTYL) 10 mg capsule, TAKE ONE CAPSULE BY MOUTH 3 TIMES A DAY AS NEEDED ABDOMINAL PAIN OR DIARRHEA, Disp: 30 Cap, Rfl: 3    Magnesium Oxide 500 mg cap, Take  by mouth., Disp: , Rfl:     calcium-cholecalciferol, d3, 600-125 mg-unit tab, Take  by mouth., Disp: , Rfl:     Cholecalciferol, Vitamin D3, (VITAMIN D3) 1,000 unit cap, Take 1 Cap by mouth daily. , Disp: , Rfl:     omega-3 fatty acids-vitamin e (FISH OIL) 1,000 mg cap, Take 1 Cap by mouth., Disp: , Rfl:     loratadine (CLARITIN) 10 mg tablet, Take 1 Tab by mouth daily. , Disp: 30 Tab, Rfl: 3    multivitamin (ONE A DAY) tablet, Take 1 Tab by mouth daily. , Disp: , Rfl:     ROS  See above  Physical Exam  Appears well. A&Ox3  nml resp effort and rate. ASSESSMENT and PLAN  COVID - asymptomatic. Needs negative testing to go to [place].     Orders Placed This Encounter    NOVEL CORONAVIRUS (COVID-19) ([facility] Default)

## 2021-01-06 ENCOUNTER — TELEPHONE (OUTPATIENT)
Dept: INTERNAL MEDICINE CLINIC | Age: 76
End: 2021-01-06

## 2021-01-06 LAB — SARS-COV-2, NAA: NOT DETECTED

## 2021-01-06 NOTE — TELEPHONE ENCOUNTER
I did not call her today. Please let her know. Her COVID test is still pending. Lease let her know I will call her asap when the COVID results are in.

## 2021-05-26 ENCOUNTER — HOSPITAL ENCOUNTER (EMERGENCY)
Age: 76
Discharge: HOME | End: 2021-05-26
Attending: EMERGENCY MEDICINE
Payer: MEDICARE

## 2021-05-26 VITALS
HEART RATE: 55 BPM | DIASTOLIC BLOOD PRESSURE: 70 MMHG | SYSTOLIC BLOOD PRESSURE: 115 MMHG | TEMPERATURE: 97 F | OXYGEN SATURATION: 98 % | RESPIRATION RATE: 16 BRPM

## 2021-05-26 DIAGNOSIS — K62.5 RECTAL BLEEDING: Primary | ICD-10-CM

## 2021-05-26 DIAGNOSIS — K64.4 EXTERNAL HEMORRHOIDS: ICD-10-CM

## 2021-05-26 LAB
ALBUMIN SERPL-MCNC: 3.8 G/DL (ref 3.5–5)
ALBUMIN/GLOB SERPL: 1.2 {RATIO} (ref 1.1–2.2)
ALP SERPL-CCNC: 50 U/L (ref 45–117)
ALT SERPL-CCNC: 23 U/L (ref 12–78)
ANION GAP SERPL CALC-SCNC: 4 MMOL/L (ref 5–15)
AST SERPL-CCNC: 22 U/L (ref 15–37)
BASOPHILS # BLD: 0.1 K/UL (ref 0–0.1)
BASOPHILS NFR BLD: 1 % (ref 0–1)
BILIRUB SERPL-MCNC: 0.3 MG/DL (ref 0.2–1)
BUN SERPL-MCNC: 16 MG/DL (ref 6–20)
BUN/CREAT SERPL: 34 (ref 12–20)
CALCIUM SERPL-MCNC: 9.2 MG/DL (ref 8.5–10.1)
CHLORIDE SERPL-SCNC: 107 MMOL/L (ref 97–108)
CO2 SERPL-SCNC: 28 MMOL/L (ref 21–32)
COMMENT, HOLDF: NORMAL
CREAT SERPL-MCNC: 0.47 MG/DL (ref 0.55–1.02)
DIFFERENTIAL METHOD BLD: ABNORMAL
EOSINOPHIL # BLD: 0.2 K/UL (ref 0–0.4)
EOSINOPHIL NFR BLD: 4 % (ref 0–7)
ERYTHROCYTE [DISTWIDTH] IN BLOOD BY AUTOMATED COUNT: 12.7 % (ref 11.5–14.5)
GLOBULIN SER CALC-MCNC: 3.2 G/DL (ref 2–4)
GLUCOSE SERPL-MCNC: 83 MG/DL (ref 65–100)
HCT VFR BLD AUTO: 38 % (ref 35–47)
HEMOCCULT STL QL: NEGATIVE
HGB BLD-MCNC: 12.4 G/DL (ref 11.5–16)
IMM GRANULOCYTES # BLD AUTO: 0 K/UL (ref 0–0.04)
IMM GRANULOCYTES NFR BLD AUTO: 0 % (ref 0–0.5)
LYMPHOCYTES # BLD: 3.2 K/UL (ref 0.8–3.5)
LYMPHOCYTES NFR BLD: 58 % (ref 12–49)
MCH RBC QN AUTO: 30.7 PG (ref 26–34)
MCHC RBC AUTO-ENTMCNC: 32.6 G/DL (ref 30–36.5)
MCV RBC AUTO: 94.1 FL (ref 80–99)
MONOCYTES # BLD: 0.5 K/UL (ref 0–1)
MONOCYTES NFR BLD: 10 % (ref 5–13)
NEUTS SEG # BLD: 1.5 K/UL (ref 1.8–8)
NEUTS SEG NFR BLD: 27 % (ref 32–75)
NRBC # BLD: 0 K/UL (ref 0–0.01)
NRBC BLD-RTO: 0 PER 100 WBC
PLATELET # BLD AUTO: 201 K/UL (ref 150–400)
PMV BLD AUTO: 10.1 FL (ref 8.9–12.9)
POTASSIUM SERPL-SCNC: 3.7 MMOL/L (ref 3.5–5.1)
PROT SERPL-MCNC: 7 G/DL (ref 6.4–8.2)
RBC # BLD AUTO: 4.04 M/UL (ref 3.8–5.2)
SAMPLES BEING HELD,HOLD: NORMAL
SODIUM SERPL-SCNC: 139 MMOL/L (ref 136–145)
WBC # BLD AUTO: 5.5 K/UL (ref 3.6–11)

## 2021-05-26 PROCEDURE — 82272 OCCULT BLD FECES 1-3 TESTS: CPT

## 2021-05-26 PROCEDURE — 36415 COLL VENOUS BLD VENIPUNCTURE: CPT

## 2021-05-26 PROCEDURE — 80053 COMPREHEN METABOLIC PANEL: CPT

## 2021-05-26 PROCEDURE — 85025 COMPLETE CBC W/AUTO DIFF WBC: CPT

## 2021-05-26 PROCEDURE — 99282 EMERGENCY DEPT VISIT SF MDM: CPT

## 2021-05-26 RX ORDER — PHENYLEPHRINE HCL, WITCH HAZEL 2.5; 5 MG/G; MG/G
GEL TOPICAL
Qty: 25 G | Refills: 0 | Status: SHIPPED | OUTPATIENT
Start: 2021-05-26 | End: 2021-12-21

## 2021-05-26 NOTE — ED PROVIDER NOTES
76 y/o female presenting with complaint of rectal bleeding. The patient states that she began to notice bright red blood per rectum this morning. She reports a similar episode 1 year ago, states that the bleeding is not as heavy at this time. She called her GI doctor today who advised her to come to the ED to evaluate for anemia. She denies any pain or itching, no abdominal pain, N/V/D, lightheadedness or syncope. The history is provided by the patient. Past Medical History:   Diagnosis Date    Colon polyps 1996    Esophageal spasm     Migraine     Osteopenia     Vertigo        Past Surgical History:   Procedure Laterality Date    COLONOSCOPY N/A 7/2/2019    COLONOSCOPY performed by Damon Nelson MD at [facility]. [address], COLON, DIAGNOSTIC  2012    negative; 4 th one    HX ORTHOPAEDIC Right 2001    r knee ACL, removal of loose body    HX ORTHOPAEDIC Right 1995    R wrist surgery after carpal fractures     HX ORTHOPAEDIC Right 9-2014    rotator cuff repair/ reattached biceps with screw         Family History:   Problem Relation Age of Onset    Hypertension Mother     Hypertension Father     MS Daughter     Other Brother         celiac sprue    Breast Cancer Paternal Grandmother         Age Late 60's/Early 66's       Social History     Socioeconomic History    Marital status:      Spouse name: Not on file    Number of children: Not on file    Years of education: Not on file    Highest education level: Not on file   Occupational History    Not on file   Tobacco Use    Smoking status: Never Smoker    Smokeless tobacco: Never Used   Substance and Sexual Activity    Alcohol use:  Yes     Alcohol/week: 8.0 standard drinks     Types: 1 Glasses of wine, 7 Standard drinks or equivalent per week     Comment: 1 at dinner    Drug use: No    Sexual activity: Yes     Partners: Male   Other Topics Concern    Not on file   Social History Narrative    Not on file     Social Determinants of Health     Financial Resource Strain:     Difficulty of Paying Living Expenses:    Food Insecurity:     Worried About Running Out of Food in the Last Year:     [address] in the Last Year:    Transportation Needs:     Lack of Transportation (Medical):  Lack of Transportation (Non-Medical):    Physical Activity:     Days of Exercise per Week:     Minutes of Exercise per Session:    Stress:     Feeling of Stress :    Social Connections:     Frequency of Communication with Friends and Family:     Frequency of Social Gatherings with Friends and Family:     Attends Restorationist Services:     Active Member of Clubs or Organizations:     Attends Club or Organization Meetings:     Marital Status:    Intimate Partner Violence:     Fear of Current or Ex-Partner:     Emotionally Abused:     Physically Abused:     Sexually Abused: ALLERGIES: Darvocet a500 [propoxyphene n-acetaminophen], Flexeril [cyclobenzaprine], Pcn [penicillins], Percocet [oxycodone-acetaminophen], and Poison ivy [vit e-nonoxynol 9-aloe vera]    Review of Systems   Constitutional: Negative for chills and fever. HENT: Negative for congestion. Respiratory: Negative for cough. Cardiovascular: Negative for chest pain. Gastrointestinal: Negative for abdominal pain, diarrhea, nausea, rectal pain and vomiting. Neurological: Negative for syncope and light-headedness. All other systems reviewed and are negative. Vitals:    05/26/21 1431   BP: (!) 145/77   Pulse: (!) 55   Resp: 16   Temp: 97 °F (36.1 °C)   SpO2: 98%            Physical Exam  Vitals and nursing note reviewed. Exam conducted with a chaperone present. Constitutional:       General: She is not in acute distress. Appearance: She is well-developed. She is not diaphoretic. HENT:      Head: Normocephalic and atraumatic.    Eyes:      Conjunctiva/sclera: Conjunctivae normal.   Cardiovascular:      Rate and Rhythm: Normal rate and regular rhythm. Heart sounds: Normal heart sounds. Pulmonary:      Effort: Pulmonary effort is normal.      Breath sounds: Normal breath sounds. Abdominal:      General: Bowel sounds are normal. There is no distension. Palpations: Abdomen is soft. Tenderness: There is no abdominal tenderness. There is no guarding or rebound. Genitourinary:     Rectum: External hemorrhoid present. No tenderness. Comments: No blood noted on rectal exam.  Musculoskeletal:      Cervical back: Normal range of motion and neck supple. Skin:     General: Skin is warm and dry. Neurological:      Mental Status: She is alert and oriented to person, place, and time. MDM       Procedures        78 y/o female presenting with complaint of rectal bleeding. The patient is well-appearing in no acute distress, no active bleeding on rectal exam, Hgb 12.4, stool is Hemoccult negative. Safe for discharge home with Rx for Preparation H, outpatient GI follow-up. Strict ED return precautions discussed and provided in writing at time of discharge. The patient verbalized understanding and agreement with this plan.

## 2021-05-26 NOTE — ED TRIAGE NOTES
Pt stated she has IBS and hemorrhoids and the hemorrhoids bleed often, pt sent to make sure it was from the hemorrhoids, denies pain, started today, only on toilet paper when wiping

## 2021-12-21 ENCOUNTER — OFFICE VISIT (OUTPATIENT)
Dept: INTERNAL MEDICINE CLINIC | Age: 76
End: 2021-12-21
Payer: MEDICARE

## 2021-12-21 VITALS
DIASTOLIC BLOOD PRESSURE: 74 MMHG | OXYGEN SATURATION: 98 % | HEART RATE: 75 BPM | TEMPERATURE: 98.6 F | BODY MASS INDEX: 22.5 KG/M2 | SYSTOLIC BLOOD PRESSURE: 114 MMHG | RESPIRATION RATE: 20 BRPM | WEIGHT: 127 LBS | HEIGHT: 63 IN

## 2021-12-21 DIAGNOSIS — Z00.00 MEDICARE ANNUAL WELLNESS VISIT, SUBSEQUENT: Primary | ICD-10-CM

## 2021-12-21 DIAGNOSIS — Z12.31 ENCOUNTER FOR SCREENING MAMMOGRAM FOR MALIGNANT NEOPLASM OF BREAST: ICD-10-CM

## 2021-12-21 DIAGNOSIS — I83.813 VARICOSE VEINS OF BOTH LOWER EXTREMITIES WITH PAIN: ICD-10-CM

## 2021-12-21 DIAGNOSIS — Z78.0 POSTMENOPAUSAL STATE: ICD-10-CM

## 2021-12-21 DIAGNOSIS — Z71.89 ADVANCE DIRECTIVE DISCUSSED WITH PATIENT: ICD-10-CM

## 2021-12-21 PROCEDURE — G8420 CALC BMI NORM PARAMETERS: HCPCS | Performed by: INTERNAL MEDICINE

## 2021-12-21 PROCEDURE — G8510 SCR DEP NEG, NO PLAN REQD: HCPCS | Performed by: INTERNAL MEDICINE

## 2021-12-21 PROCEDURE — G8399 PT W/DXA RESULTS DOCUMENT: HCPCS | Performed by: INTERNAL MEDICINE

## 2021-12-21 PROCEDURE — G0439 PPPS, SUBSEQ VISIT: HCPCS | Performed by: INTERNAL MEDICINE

## 2021-12-21 PROCEDURE — G8536 NO DOC ELDER MAL SCRN: HCPCS | Performed by: INTERNAL MEDICINE

## 2021-12-21 PROCEDURE — 1101F PT FALLS ASSESS-DOCD LE1/YR: CPT | Performed by: INTERNAL MEDICINE

## 2021-12-21 PROCEDURE — G8427 DOCREV CUR MEDS BY ELIG CLIN: HCPCS | Performed by: INTERNAL MEDICINE

## 2021-12-21 PROCEDURE — 3017F COLORECTAL CA SCREEN DOC REV: CPT | Performed by: INTERNAL MEDICINE

## 2021-12-21 NOTE — PROGRESS NOTES
This is the Subsequent Medicare Annual Wellness Exam, performed 12 months or more after the Initial AWV or the last Subsequent AWV    I have reviewed the patient's medical history in detail and updated the computerized patient record. Injured right wrist 20 years ago requiring pins. Saw Dr. Ivan Persaud due to increased pain. Worse picking up things. Now wearing wrist splint if carrying things/opening things. Plays 9 holes of golf only. Had left rotator cuff repair with Dr. William. Did well. Still with pain lying on left side. Having issues with GI tract. Had been seeing Dr. Luisa Reddy who referred her to a nutritionist.  Had been having diarrhea daily and lost a significant amt of weight. Started elimination diet. No broccoli and most dark green leafy vegetables, fried foods, higher fat foods. Tends to avoid most sauces. Also started some enzymes. Had some rectal bleeding with internal hemorrhoids. Dr. Luisa Reddy, referred to another member of group and had the hemorrhoids banded. Starting to have some bladder leakage during the night. Fine during the day. Does Kegel exercises daily. Hair loss. Seeing Dr. Vidal Mejía. Recommended Rogaine which helped, had more itching of scalp when it was hot. Due for mammogram.    varicose veins - some pain. Worse after standing. Tends to wear support hose in winter but to hot to wear in summer. Cataracts on eye exam and \"not ready for extraction\". Finds can't read as long and without significant light. Due DXA. Last 2 years ago. Stopped Fosamax, couldn't remember to take. UTD COVID vaccine. Had COVID test this week as family coming to visit. Test was negative. Assessment/Plan   Education and counseling provided:  Are appropriate based on today's review and evaluation  advanced directive discussed with patient.       1. Varicose veins of both lower extremities with pain  -     REFERRAL TO VASCULAR SURGERY  2. Medicare annual wellness visit, subsequent  3. Postmenopausal state  -     DEXA BONE DENSITY STUDY AXIAL; Future  4. Encounter for screening mammogram for malignant neoplasm of breast  -     [facility] 3D CLAUDIA W MAMMO BI SCREENING INCL CAD; Future       Depression Risk Factor Screening     3 most recent PHQ Screens 12/21/2021   Little interest or pleasure in doing things Not at all   Feeling down, depressed, irritable, or hopeless Not at all   Total Score PHQ 2 0       Alcohol & Drug Abuse Risk Screen    Do you average more than 1 drink per night or more than 7 drinks a week:  Yes    On any one occasion in the past three months have you have had more than 3 drinks containing alcohol:  No          Functional Ability and Level of Safety    Hearing: Hearing is good. Activities of Daily Living: The home contains: no safety equipment. Patient does total self care      Ambulation: with no difficulty     Fall Risk:  Fall Risk Assessment, last 12 mths 12/21/2021   Able to walk? Yes   Fall in past 12 months? 0   Do you feel unsteady? 1   Are you worried about falling 0   Number of falls in past 12 months -   Fall with injury? -      Abuse Screen:  Patient is not abused     ROS:  Negative except  Diarrhea as above - takes Bentyl 1-2 times per month.         Visit Vitals  /74   Pulse 75   Temp 98.6 °F (37 °C) (Temporal)   Resp 20   Ht 5' 3\" (1.6 m)   Wt 127 lb (57.6 kg)   SpO2 98%   BMI 22.50 kg/m²     PE:      Cognitive Screening    Has your family/caregiver stated any concerns about your memory: yes - anxiety triggers short term memory loss     Cognitive Screening: Normal -      Health Maintenance Due     Health Maintenance Due   Topic Date Due    Flu Vaccine (1) Up to date    Medicare Yearly Exam  12/021/2022    COVID-19 Vaccine (2 - Moderna 3-dose series) Up to date       Patient Care Team   Patient Care Team:  Saúl White MD as PCP - General (Internal Medicine)  Saúl White MD as PCP - [address] [place] [address] Dr Stocktonled Provider  Luanne Love Oc Pace MD (Obstetrics & Gynecology)  Ro Corley MD (Ophthalmology)  Cm Austin MD (Dermatology)  Louie Salinas MD (Gastroenterology)    History     Patient Active Problem List   Diagnosis Code    Allergic dermatitis due to poison ivy L23.7    Vertigo, benign paroxysmal H81.10    Migraine G43.909    Hx of basal cell carcinoma Z85.828    History of colon polyps Z86.010    Esophageal spasm K22.4    Advance directive discussed with patient Z71.89    Chronic bilateral low back pain with right-sided sciatica M54.41, G89.29    PUD (peptic ulcer disease) K27.9    Irritable bowel syndrome with diarrhea K58.0    Primary osteoarthritis of both knees M17.0     Past Medical History:   Diagnosis Date    Colon polyps 1996    Esophageal spasm     Migraine     Osteopenia     Vertigo       Past Surgical History:   Procedure Laterality Date    COLONOSCOPY N/A 7/2/2019    COLONOSCOPY performed by Louie Salinas MD at [facility]. [address], COLON, DIAGNOSTIC  2012    negative; 4 th one    HX ORTHOPAEDIC Right 2001    r knee ACL, removal of loose body    HX ORTHOPAEDIC Right 1995    R wrist surgery after carpal fractures     HX ORTHOPAEDIC Right 9-2014    rotator cuff repair/ reattached biceps with screw     Current Outpatient Medications   Medication Sig Dispense Refill    acetaminophen (Tylenol Extra Strength) 500 mg tablet Take 1,000 mg by mouth two (2) times a day.  dicyclomine (BENTYL) 10 mg capsule TAKE ONE CAPSULE BY MOUTH 3 TIMES A DAY AS NEEDED ABDOMINAL PAIN OR DIARRHEA 30 Cap 3    Magnesium Oxide 500 mg cap Take 1 Tablet by mouth daily.  calcium-cholecalciferol, d3, 600-125 mg-unit tab Take 1 Tablet by mouth daily.  Cholecalciferol, Vitamin D3, (VITAMIN D3) 1,000 unit cap Take 1 Cap by mouth daily.  omega-3 fatty acids-vitamin e (FISH OIL) 1,000 mg cap Take 1 Cap by mouth.  multivitamin (ONE A DAY) tablet Take 1 Tab by mouth daily. Allergies   Allergen Reactions    [de-identified] A500 [Propoxyphene N-Acetaminophen] Other (comments)     headache    Flexeril [Cyclobenzaprine] Vertigo    Pcn [Penicillins] Hives    Percocet [Oxycodone-Acetaminophen] Other (comments)     Headache - severe    Poison Ivy [Vit E-Nonoxynol 9-Aloe Vera] Rash     Severe allergy       Family History   Problem Relation Age of Onset    Hypertension Mother     Hypertension Father     Mult Sclerosis Daughter     Other Brother         celiac sprue    Breast Cancer Paternal Grandmother         Age Late 60's/Early 66's     Social History     Tobacco Use    Smoking status: Never Smoker    Smokeless tobacco: Never Used   Substance Use Topics    Alcohol use:  Yes     Alcohol/week: 8.0 standard drinks     Types: 1 Glasses of wine, 7 Standard drinks or equivalent per week     Comment: 1 at dinner         Ara Reed MD

## 2021-12-21 NOTE — PATIENT INSTRUCTIONS
Medicare Wellness Visit, Female     The best way to live healthy is to have a lifestyle where you eat a well-balanced diet, exercise regularly, limit alcohol use, and quit all forms of tobacco/nicotine, if applicable. Regular preventive services are another way to keep healthy. Preventive services (vaccines, screening tests, monitoring & exams) can help personalize your care plan, which helps you manage your own care. Screening tests can find health problems at the earliest stages, when they are easiest to treat. [facility] follows the current, evidence-based guidelines published by the [facility] ([facility]) when recommending preventive services for our patients. Because we follow these guidelines, sometimes recommendations change over time as research supports it. (For example, mammograms used to be recommended annually. Even though Medicare will still pay for an annual mammogram, the newer guidelines recommend a mammogram every two years for women of average risk). Of course, you and your doctor may decide to screen more often for some diseases, based on your risk and your co-morbidities (chronic disease you are already diagnosed with). Preventive services for you include:  - Medicare offers their members a free annual wellness visit, which is time for you and your primary care provider to discuss and plan for your preventive service needs. Take advantage of this benefit every year!  -All adults over the age of 72 should receive the recommended pneumonia vaccines. Current USPSTF guidelines recommend a series of two vaccines for the best pneumonia protection.   -All adults should have a flu vaccine yearly and a tetanus vaccine every 10 years.   -All adults age 48 and older should receive the shingles vaccines (series of two vaccines).       -All adults age 38-68 who are overweight should have a diabetes screening test once every three years.   -All adults born between 80 and 1965 should be screened once for Hepatitis C.  -Other screening tests and preventive services for persons with diabetes include: an eye exam to screen for diabetic retinopathy, a kidney function test, a foot exam, and stricter control over your cholesterol.   -Cardiovascular screening for adults with routine risk involves an electrocardiogram (ECG) at intervals determined by your doctor.   -Colorectal cancer screenings should be done for adults age 54-65 with no increased risk factors for colorectal cancer. There are a number of acceptable methods of screening for this type of cancer. Each test has its own benefits and drawbacks. Discuss with your doctor what is most appropriate for you during your annual wellness visit. The different tests include: colonoscopy (considered the best screening method), a fecal occult blood test, a fecal DNA test, and sigmoidoscopy.    -A bone mass density test is recommended when a woman turns 65 to screen for osteoporosis. This test is only recommended one time, as a screening. Some providers will use this same test as a disease monitoring tool if you already have osteoporosis. -Breast cancer screenings are recommended every other year for women of normal risk, age 54-69.  -Cervical cancer screenings for women over age 72 are only recommended with certain risk factors.      Here is a list of your current Health Maintenance items (your personalized list of preventive services) with a due date:        Health Maintenance Due   Topic Date Due    Flu Vaccine (1) Up to date    Medicare Yearly Exam  12/021/2022    COVID-19 Vaccine (2 - Moderna 3-dose series) Up to date

## 2021-12-21 NOTE — PROGRESS NOTES
1. Have you been to the ER, urgent care clinic since your last visit? Hospitalized since your last visit? No    2. Have you seen or consulted any other health care providers outside of the [address] [place] [address] since your last visit? Include any pap smears or colon screening. Yes, nutritionalist, Gastro, Dermatology,     Health Maintenance Due   Topic Date Due    Flu Vaccine (1) 09/01/2021    Medicare Yearly Exam  09/05/2021    COVID-19 Vaccine (2 - Moderna 3-dose series) 09/18/2021     Patient her today for Medicare Wellness. Patient has been informed of any other discussion outside the parameters of the physical could result in other charges including a co pay, patient verbalized understanding.

## 2022-02-07 ENCOUNTER — HOSPITAL ENCOUNTER (OUTPATIENT)
Dept: MAMMOGRAPHY | Age: 77
Discharge: HOME | End: 2022-02-07
Attending: INTERNAL MEDICINE
Payer: MEDICARE

## 2022-02-07 DIAGNOSIS — Z78.0 POSTMENOPAUSAL STATE: ICD-10-CM

## 2022-02-07 DIAGNOSIS — Z12.31 ENCOUNTER FOR SCREENING MAMMOGRAM FOR MALIGNANT NEOPLASM OF BREAST: ICD-10-CM

## 2022-02-07 PROCEDURE — 77063 BREAST TOMOSYNTHESIS BI: CPT

## 2022-02-07 PROCEDURE — 77080 DXA BONE DENSITY AXIAL: CPT

## 2022-02-21 ENCOUNTER — HOSPITAL ENCOUNTER (OUTPATIENT)
Dept: INFUSION THERAPY | Age: 77
Discharge: HOME | End: 2022-02-21
Payer: MEDICARE

## 2022-02-21 VITALS
TEMPERATURE: 97 F | DIASTOLIC BLOOD PRESSURE: 61 MMHG | RESPIRATION RATE: 16 BRPM | HEART RATE: 65 BPM | SYSTOLIC BLOOD PRESSURE: 113 MMHG

## 2022-02-21 LAB
ALBUMIN SERPL-MCNC: 3.5 G/DL (ref 3.5–5)
ANION GAP SERPL CALC-SCNC: 4 MMOL/L (ref 5–15)
BUN SERPL-MCNC: 19 MG/DL (ref 6–20)
BUN/CREAT SERPL: 31 (ref 12–20)
CALCIUM SERPL-MCNC: 9.3 MG/DL (ref 8.5–10.1)
CHLORIDE SERPL-SCNC: 107 MMOL/L (ref 97–108)
CO2 SERPL-SCNC: 29 MMOL/L (ref 21–32)
CREAT SERPL-MCNC: 0.62 MG/DL (ref 0.55–1.02)
GLUCOSE SERPL-MCNC: 100 MG/DL (ref 65–100)
MAGNESIUM SERPL-MCNC: 2.5 MG/DL (ref 1.6–2.4)
PHOSPHATE SERPL-MCNC: 3.8 MG/DL (ref 2.6–4.7)
PHOSPHATE SERPL-MCNC: 3.8 MG/DL (ref 2.6–4.7)
POTASSIUM SERPL-SCNC: 4.2 MMOL/L (ref 3.5–5.1)
SODIUM SERPL-SCNC: 140 MMOL/L (ref 136–145)

## 2022-02-21 PROCEDURE — 96372 THER/PROPH/DIAG INJ SC/IM: CPT

## 2022-02-21 PROCEDURE — 80069 RENAL FUNCTION PANEL: CPT

## 2022-02-21 PROCEDURE — 83735 ASSAY OF MAGNESIUM: CPT

## 2022-02-21 PROCEDURE — 36415 COLL VENOUS BLD VENIPUNCTURE: CPT

## 2022-02-21 PROCEDURE — 84100 ASSAY OF PHOSPHORUS: CPT

## 2022-02-21 PROCEDURE — 74011250636 HC RX REV CODE- 250/636: Performed by: INTERNAL MEDICINE

## 2022-02-21 RX ADMIN — DENOSUMAB 60 MG: 60 INJECTION SUBCUTANEOUS at 14:33

## 2022-02-21 NOTE — PROGRESS NOTES
[facility] Progress Note    Date: 2022    Name: Paramjit Palencia    MRN: [id number]         : 1945        1255: Pt arrived ambulatory to [facility] for [facility] in stable condition. Assessment completed. No other complaints voiced at this time. Labs drawn peripherally per order and sent for processing. Patient denies SOB, fever, cough, general not feeling well. Patient denies recent exposure to someone who has tested positive for COVID-19. Patient denies having contact with anyone who has a pending COVID test.      Patient Vitals for the past 12 hrs:   Temp Pulse Resp BP   22 1256 97 °F (36.1 °C) 65 16 113/61       Left Arm  Medications Administered     denosumab (PROLIA) injection 60 mg     Admin Date  2022 Action  Given Dose  60 mg Route  SubCUTAneous Administered By  Taisha Camargo, SAULO                  0552:  Tolerated treatment well, no adverse reactions noted. D/Cd from [facility] ambulatory and in no distress.       Future Appointments   Date Time Provider Lana Snyder   2022  3:30 PM G2 RUBEN FASTRACK [facility]    2022  4:00 PM H3 RUBEN LAB [facility]   8/15/2022  3:30 PM G2 RUBEN FASTRACK [facility]            Yris Barreto RN  2022

## 2022-03-18 PROBLEM — K27.9 PUD (PEPTIC ULCER DISEASE): Status: ACTIVE | Noted: 2020-09-04

## 2022-03-19 PROBLEM — Z71.89 ADVANCE DIRECTIVE DISCUSSED WITH PATIENT: Status: ACTIVE | Noted: 2017-02-23

## 2022-03-19 PROBLEM — G89.29 CHRONIC BILATERAL LOW BACK PAIN WITH RIGHT-SIDED SCIATICA: Status: ACTIVE | Noted: 2019-08-19

## 2022-03-19 PROBLEM — M17.0 PRIMARY OSTEOARTHRITIS OF BOTH KNEES: Status: ACTIVE | Noted: 2020-09-04

## 2022-03-19 PROBLEM — M54.41 CHRONIC BILATERAL LOW BACK PAIN WITH RIGHT-SIDED SCIATICA: Status: ACTIVE | Noted: 2019-08-19

## 2022-03-19 PROBLEM — K58.0 IRRITABLE BOWEL SYNDROME WITH DIARRHEA: Status: ACTIVE | Noted: 2020-09-04

## 2022-05-13 ENCOUNTER — OFFICE VISIT (OUTPATIENT)
Dept: INTERNAL MEDICINE CLINIC | Age: 77
End: 2022-05-13
Payer: MEDICARE

## 2022-05-13 VITALS
WEIGHT: 129 LBS | RESPIRATION RATE: 18 BRPM | HEIGHT: 63 IN | TEMPERATURE: 97.4 F | SYSTOLIC BLOOD PRESSURE: 134 MMHG | HEART RATE: 65 BPM | BODY MASS INDEX: 22.86 KG/M2 | OXYGEN SATURATION: 97 % | DIASTOLIC BLOOD PRESSURE: 79 MMHG

## 2022-05-13 DIAGNOSIS — K64.9 HEMORRHOIDS, UNSPECIFIED HEMORRHOID TYPE: ICD-10-CM

## 2022-05-13 DIAGNOSIS — F41.9 ANXIETY: ICD-10-CM

## 2022-05-13 DIAGNOSIS — K58.0 IRRITABLE BOWEL SYNDROME WITH DIARRHEA: Primary | ICD-10-CM

## 2022-05-13 PROCEDURE — 1090F PRES/ABSN URINE INCON ASSESS: CPT | Performed by: INTERNAL MEDICINE

## 2022-05-13 PROCEDURE — G8427 DOCREV CUR MEDS BY ELIG CLIN: HCPCS | Performed by: INTERNAL MEDICINE

## 2022-05-13 PROCEDURE — G8510 SCR DEP NEG, NO PLAN REQD: HCPCS | Performed by: INTERNAL MEDICINE

## 2022-05-13 PROCEDURE — 1101F PT FALLS ASSESS-DOCD LE1/YR: CPT | Performed by: INTERNAL MEDICINE

## 2022-05-13 PROCEDURE — G8399 PT W/DXA RESULTS DOCUMENT: HCPCS | Performed by: INTERNAL MEDICINE

## 2022-05-13 PROCEDURE — G8420 CALC BMI NORM PARAMETERS: HCPCS | Performed by: INTERNAL MEDICINE

## 2022-05-13 PROCEDURE — G8536 NO DOC ELDER MAL SCRN: HCPCS | Performed by: INTERNAL MEDICINE

## 2022-05-13 PROCEDURE — 99214 OFFICE O/P EST MOD 30 MIN: CPT | Performed by: INTERNAL MEDICINE

## 2022-05-13 PROCEDURE — G0463 HOSPITAL OUTPT CLINIC VISIT: HCPCS | Performed by: INTERNAL MEDICINE

## 2022-05-13 NOTE — PROGRESS NOTES
Aron Danielle is a 69 y.o. female    Chief Complaint   Patient presents with    Irritable Bowel Syndrome     recently had \"banding procedure\" x 1 mo ago; rectal bleeding after procedure    Sleep Problem     waking up frequently throughout the night; diarrhea       Visit Vitals  /79   Pulse 65   Temp 97.4 °F (36.3 °C) (Temporal)   Resp 18   Ht 5' 3\" (1.6 m)   Wt 129 lb (58.5 kg)   SpO2 97%   BMI 22.85 kg/m²       3 most recent PHQ Screens 5/13/2022   Little interest or pleasure in doing things Not at all   Feeling down, depressed, irritable, or hopeless Not at all   Total Score PHQ 2 0       Fall Risk Assessment, last 12 mths 5/13/2022   Able to walk? Yes   Fall in past 12 months? 0   Do you feel unsteady? -   Are you worried about falling -   Number of falls in past 12 months -   Fall with injury? -       Abuse Screening Questionnaire 8/19/2019   Do you ever feel afraid of your partner? N   Are you in a relationship with someone who physically or mentally threatens you? N   Is it safe for you to go home? Y       1. Have you been to the ER, urgent care clinic since your last visit? Hospitalized since your last visit? Yes for rectal bleeding 03/22    2. Have you seen or consulted any other health care providers outside of the [address] [place] [address] since your last visit? Include any pap smears or colon screening.  Yes GI

## 2022-05-13 NOTE — PROGRESS NOTES
HISTORY OF PRESENT ILLNESS  Vania Mclean is a 69 y.o. female. HPI  Having continuing issues with IBS. Saw dietician, went on elimination diet then slowly added food back in. Helped with diarrhea. Has been trying to stick with diet for the last year. In the last couple months back to having diarrhea daily - afraid to leave the house. Restarted dicyclomine once to twice a day. Started with rectal bleeding as well. Saw Dr. Emmy Mahajan, underwent banding of hemorrhoids this winter which has helped with the bleeding. Bleeding restarted 3-4 weeks ago - initially gushing but less now. No cramping or abdominal pain. Initially lost some wieght but then stabilized out. Increased anxiety. When anxiety is up having word finding ability. Takes a while but then can find the word. Usually names and at night when tired. Still playing golf - 9 holes only secondary to wrist pain. Low back pain and left hip pain. Heat. Tylenol and stretching (former physical therapist)    Coughing at night, post nasal drainage. Taking otc allergy medications. Will also use Flonase which helps. Current Outpatient Medications   Medication Instructions    acetaminophen (TYLENOL EXTRA STRENGTH) 1,000 mg, Oral, 2 TIMES DAILY    calcium-cholecalciferol, d3, 600-125 mg-unit tab 1 Tablet, Oral, DAILY    Cholecalciferol, Vitamin D3, (VITAMIN D3) 1,000 unit cap 1 Capsule, Oral, DAILY    dicyclomine (BENTYL) 10 mg capsule TAKE ONE CAPSULE BY MOUTH 3 TIMES A DAY AS NEEDED ABDOMINAL PAIN OR DIARRHEA    Magnesium Oxide 500 mg cap 1 Tablet, Oral, DAILY    multivitamin (ONE A DAY) tablet 1 Tablet, DAILY    omega-3 fatty acids-vitamin e (FISH OIL) 1,000 mg cap 1 Capsule, Oral     Visit Vitals  /79   Pulse 65   Temp 97.4 °F (36.3 °C) (Temporal)   Resp 18   Ht 5' 3\" (1.6 m)   Wt 129 lb (58.5 kg)   SpO2 97%   BMI 22.85 kg/m²       ROS   See above  Physical Exam  Vitals reviewed. Constitutional:       Appearance: Normal appearance. HENT:      Head: Normocephalic and atraumatic. Cardiovascular:      Rate and Rhythm: Normal rate and regular rhythm. Pulses: Normal pulses. Heart sounds: Normal heart sounds. Pulmonary:      Effort: Pulmonary effort is normal.      Breath sounds: Normal breath sounds. Abdominal:      General: Abdomen is flat. Bowel sounds are normal. There is no distension. Palpations: Abdomen is soft. There is no mass. Tenderness: There is no abdominal tenderness. Musculoskeletal:      Cervical back: Normal range of motion and neck supple. Left lower leg: No edema. Neurological:      Mental Status: She is alert and oriented to person, place, and time. ASSESSMENT and PLAN  Diagnoses and all orders for this visit:    1. Irritable bowel syndrome with diarrhea - not at goal.  Stop Magnesium, start IB Guard. If no improvement would refer back to nutritionist.      2. Anxiety - secondary to unpredictability of diarrhea. Will work on diarrhea first.      3. Hemorrhoids, unspecified hemorrhoid type - referred back to GI    4. Decreased word finding - most likely secondary to anxiety. Will revisit at next visit. If still present, MMSE      Follow-up and Dispositions    · Return in about 6 weeks (around 6/24/2022) for ibs.

## 2022-06-24 ENCOUNTER — OFFICE VISIT (OUTPATIENT)
Dept: INTERNAL MEDICINE CLINIC | Age: 77
End: 2022-06-24
Payer: MEDICARE

## 2022-06-24 VITALS
DIASTOLIC BLOOD PRESSURE: 78 MMHG | WEIGHT: 128.2 LBS | SYSTOLIC BLOOD PRESSURE: 125 MMHG | TEMPERATURE: 98.3 F | HEART RATE: 60 BPM | HEIGHT: 63 IN | BODY MASS INDEX: 22.71 KG/M2 | OXYGEN SATURATION: 98 % | RESPIRATION RATE: 16 BRPM

## 2022-06-24 DIAGNOSIS — K62.5 RECTAL BLEEDING: ICD-10-CM

## 2022-06-24 DIAGNOSIS — K58.0 IRRITABLE BOWEL SYNDROME WITH DIARRHEA: Primary | ICD-10-CM

## 2022-06-24 PROCEDURE — 1101F PT FALLS ASSESS-DOCD LE1/YR: CPT | Performed by: INTERNAL MEDICINE

## 2022-06-24 PROCEDURE — G8432 DEP SCR NOT DOC, RNG: HCPCS | Performed by: INTERNAL MEDICINE

## 2022-06-24 PROCEDURE — 1090F PRES/ABSN URINE INCON ASSESS: CPT | Performed by: INTERNAL MEDICINE

## 2022-06-24 PROCEDURE — G8536 NO DOC ELDER MAL SCRN: HCPCS | Performed by: INTERNAL MEDICINE

## 2022-06-24 PROCEDURE — G8427 DOCREV CUR MEDS BY ELIG CLIN: HCPCS | Performed by: INTERNAL MEDICINE

## 2022-06-24 PROCEDURE — 99213 OFFICE O/P EST LOW 20 MIN: CPT | Performed by: INTERNAL MEDICINE

## 2022-06-24 PROCEDURE — G8420 CALC BMI NORM PARAMETERS: HCPCS | Performed by: INTERNAL MEDICINE

## 2022-06-24 PROCEDURE — G8399 PT W/DXA RESULTS DOCUMENT: HCPCS | Performed by: INTERNAL MEDICINE

## 2022-06-24 PROCEDURE — G0463 HOSPITAL OUTPT CLINIC VISIT: HCPCS | Performed by: INTERNAL MEDICINE

## 2022-06-24 RX ORDER — LORATADINE 10 MG/1
10 TABLET ORAL
COMMUNITY

## 2022-06-24 NOTE — PROGRESS NOTES
Chief Complaint   Patient presents with    Irritable Bowel Syndrome       Vitals:    06/24/22 1323   BP: 125/78   Pulse: 60   Resp: 16   Temp: 98.3 °F (36.8 °C)   TempSrc: Temporal   SpO2: 98%   Weight: 128 lb 3.2 oz (58.2 kg)   Height: 5' 3\" (1.6 m)   PainSc:   0 - No pain       Health Maintenance Due   Topic    COVID-19 Vaccine (2 - Moderna 3-dose series)       1. \"Have you been to the ER, urgent care clinic since your last visit? Hospitalized since your last visit? \" No    2. \"Have you seen or consulted any other health care providers outside of the [address] [place] [address] since your last visit? \" No     3. For patients aged 39-70: Has the patient had a colonoscopy / FIT/ Cologuard? Yes - no Care Gap present      If the patient is female:    4. For patients aged 41-77: Has the patient had a mammogram within the past 2 years? Yes - no Care Gap present      5. For patients aged 21-65: Has the patient had a pap smear?  NA - based on age or sex

## 2022-06-24 NOTE — PROGRESS NOTES
HISTORY OF PRESENT ILLNESS  Vania Alegria is a 69 y.o. female. HPI   Here for f/u IBS with diarrhea. Last visit stopped magnesium but did not start IB Guard. Has not been able to see Moon Austin for follow up. Since last visit went to [place] with daughter. Had diarrhea daily for almost 3 weeks. Have very severe explosive diarrhea with pain x 1 day after returning from [place]. Had rectal bleeding as well. Went back to a strict elimination diet and better. Has an appointment next week with Dr. Farzana LUNA. Has been taking more dicyclomine at least once a day. Currently having soft and thin brown stools 6-9 times per day. Worse 6am to 9am in the am.  No weight loss since last visit but has been more fatigued. Current Outpatient Medications   Medication Instructions    acetaminophen (TYLENOL EXTRA STRENGTH) 1,000 mg, Oral, 2 TIMES DAILY    calcium-cholecalciferol, d3, 600-125 mg-unit tab 1 Tablet, Oral, DAILY    Cholecalciferol, Vitamin D3, (VITAMIN D3) 1,000 unit cap 1 Capsule, Oral, DAILY    dicyclomine (BENTYL) 10 mg capsule TAKE ONE CAPSULE BY MOUTH 3 TIMES A DAY AS NEEDED ABDOMINAL PAIN OR DIARRHEA    loratadine (CLARITIN) 10 mg, Oral    multivitamin (ONE A DAY) tablet 1 Tablet, DAILY    omega-3 fatty acids-vitamin e (FISH OIL) 1,000 mg cap 1 Capsule, Oral     Visit Vitals  /78 (BP 1 Location: Left upper arm, BP Patient Position: Sitting, BP Cuff Size: Adult)   Pulse 60   Temp 98.3 °F (36.8 °C) (Temporal)   Resp 16   Ht 5' 3\" (1.6 m)   Wt 128 lb 3.2 oz (58.2 kg)   SpO2 98%   BMI 22.71 kg/m²     ROS  See above  Physical Exam  Vitals reviewed. Constitutional:       Appearance: Normal appearance. HENT:      Head: Normocephalic and atraumatic. Cardiovascular:      Rate and Rhythm: Normal rate and regular rhythm. Pulses: Normal pulses. Heart sounds: Normal heart sounds. Pulmonary:      Effort: Pulmonary effort is normal.      Breath sounds: Normal breath sounds. Abdominal:      General: Bowel sounds are normal. There is no distension. Palpations: Abdomen is soft. There is no mass. Tenderness: There is no abdominal tenderness. Musculoskeletal:      Cervical back: Normal range of motion and neck supple. Right lower leg: No edema. Left lower leg: No edema. Neurological:      Mental Status: She is alert. ASSESSMENT and PLAN  Diagnoses and all orders for this visit:    1. Irritable bowel syndrome with diarrhea start IB Guard, continue elimination diet. F/u with Dr. Jossie Ruiz. 2. Rectal bleeding  -     CBC W/O DIFF;  Future

## 2022-08-15 ENCOUNTER — ANESTHESIA (OUTPATIENT)
Dept: ENDOSCOPY | Age: 77
End: 2022-08-15
Payer: MEDICARE

## 2022-08-15 ENCOUNTER — APPOINTMENT (OUTPATIENT)
Dept: INFUSION THERAPY | Age: 77
End: 2022-08-15
Payer: MEDICARE

## 2022-08-15 ENCOUNTER — ANESTHESIA EVENT (OUTPATIENT)
Dept: ENDOSCOPY | Age: 77
End: 2022-08-15
Payer: MEDICARE

## 2022-08-15 ENCOUNTER — HOSPITAL ENCOUNTER (OUTPATIENT)
Age: 77
Setting detail: OUTPATIENT SURGERY
Discharge: HOME | End: 2022-08-15
Attending: INTERNAL MEDICINE | Admitting: INTERNAL MEDICINE
Payer: MEDICARE

## 2022-08-15 VITALS
OXYGEN SATURATION: 99 % | BODY MASS INDEX: 23.9 KG/M2 | WEIGHT: 140 LBS | SYSTOLIC BLOOD PRESSURE: 107 MMHG | RESPIRATION RATE: 16 BRPM | TEMPERATURE: 97.5 F | DIASTOLIC BLOOD PRESSURE: 70 MMHG | HEIGHT: 64 IN | HEART RATE: 53 BPM

## 2022-08-15 PROCEDURE — 74011250636 HC RX REV CODE- 250/636: Performed by: NURSE ANESTHETIST, CERTIFIED REGISTERED

## 2022-08-15 PROCEDURE — 76040000019: Performed by: INTERNAL MEDICINE

## 2022-08-15 PROCEDURE — 76060000031 HC ANESTHESIA FIRST 0.5 HR: Performed by: INTERNAL MEDICINE

## 2022-08-15 PROCEDURE — 2709999900 HC NON-CHARGEABLE SUPPLY: Performed by: INTERNAL MEDICINE

## 2022-08-15 PROCEDURE — 88305 TISSUE EXAM BY PATHOLOGIST: CPT

## 2022-08-15 PROCEDURE — 77030019988 HC FCPS ENDOSC DISP BSC -B: Performed by: INTERNAL MEDICINE

## 2022-08-15 RX ORDER — SODIUM CHLORIDE 0.9 % (FLUSH) 0.9 %
5-40 SYRINGE (ML) INJECTION AS NEEDED
Status: DISCONTINUED | OUTPATIENT
Start: 2022-08-15 | End: 2022-08-15 | Stop reason: HOSPADM

## 2022-08-15 RX ORDER — SODIUM CHLORIDE 9 MG/ML
INJECTION, SOLUTION INTRAVENOUS
Status: DISCONTINUED | OUTPATIENT
Start: 2022-08-15 | End: 2022-08-15 | Stop reason: HOSPADM

## 2022-08-15 RX ORDER — MIDAZOLAM HYDROCHLORIDE 1 MG/ML
.25-5 INJECTION, SOLUTION INTRAMUSCULAR; INTRAVENOUS
Status: DISCONTINUED | OUTPATIENT
Start: 2022-08-15 | End: 2022-08-15 | Stop reason: HOSPADM

## 2022-08-15 RX ORDER — ATROPINE SULFATE 0.1 MG/ML
0.5 INJECTION INTRAVENOUS
Status: DISCONTINUED | OUTPATIENT
Start: 2022-08-15 | End: 2022-08-15 | Stop reason: HOSPADM

## 2022-08-15 RX ORDER — FENTANYL CITRATE 50 UG/ML
25-200 INJECTION, SOLUTION INTRAMUSCULAR; INTRAVENOUS
Status: DISCONTINUED | OUTPATIENT
Start: 2022-08-15 | End: 2022-08-15 | Stop reason: HOSPADM

## 2022-08-15 RX ORDER — EPINEPHRINE 0.1 MG/ML
1 INJECTION INTRACARDIAC; INTRAVENOUS
Status: DISCONTINUED | OUTPATIENT
Start: 2022-08-15 | End: 2022-08-15 | Stop reason: HOSPADM

## 2022-08-15 RX ORDER — FLUMAZENIL 0.1 MG/ML
0.2 INJECTION INTRAVENOUS
Status: DISCONTINUED | OUTPATIENT
Start: 2022-08-15 | End: 2022-08-15 | Stop reason: HOSPADM

## 2022-08-15 RX ORDER — SODIUM CHLORIDE 9 MG/ML
50 INJECTION, SOLUTION INTRAVENOUS CONTINUOUS
Status: DISCONTINUED | OUTPATIENT
Start: 2022-08-15 | End: 2022-08-15 | Stop reason: HOSPADM

## 2022-08-15 RX ORDER — SODIUM CHLORIDE 0.9 % (FLUSH) 0.9 %
5-40 SYRINGE (ML) INJECTION EVERY 8 HOURS
Status: DISCONTINUED | OUTPATIENT
Start: 2022-08-15 | End: 2022-08-15 | Stop reason: HOSPADM

## 2022-08-15 RX ORDER — PROPOFOL 10 MG/ML
INJECTION, EMULSION INTRAVENOUS AS NEEDED
Status: DISCONTINUED | OUTPATIENT
Start: 2022-08-15 | End: 2022-08-15 | Stop reason: HOSPADM

## 2022-08-15 RX ORDER — DEXTROMETHORPHAN/PSEUDOEPHED 2.5-7.5/.8
1.2 DROPS ORAL
Status: DISCONTINUED | OUTPATIENT
Start: 2022-08-15 | End: 2022-08-15 | Stop reason: HOSPADM

## 2022-08-15 RX ORDER — NALOXONE HYDROCHLORIDE 0.4 MG/ML
0.4 INJECTION, SOLUTION INTRAMUSCULAR; INTRAVENOUS; SUBCUTANEOUS
Status: DISCONTINUED | OUTPATIENT
Start: 2022-08-15 | End: 2022-08-15 | Stop reason: HOSPADM

## 2022-08-15 RX ADMIN — SODIUM CHLORIDE: 900 INJECTION, SOLUTION INTRAVENOUS at 09:09

## 2022-08-15 RX ADMIN — PROPOFOL 50 MG: 10 INJECTION, EMULSION INTRAVENOUS at 09:31

## 2022-08-15 RX ADMIN — PROPOFOL 50 MG: 10 INJECTION, EMULSION INTRAVENOUS at 09:19

## 2022-08-15 RX ADMIN — PROPOFOL 50 MG: 10 INJECTION, EMULSION INTRAVENOUS at 09:25

## 2022-08-15 RX ADMIN — PROPOFOL 50 MG: 10 INJECTION, EMULSION INTRAVENOUS at 09:14

## 2022-08-15 NOTE — ANESTHESIA POSTPROCEDURE EVALUATION
Procedure(s):  COLONOSCOPY  COLON BIOPSY. MAC    Anesthesia Post Evaluation        Patient participation: complete - patient participated  Level of consciousness: awake  Pain management: adequate  Airway patency: patent  Anesthetic complications: no  Cardiovascular status: hemodynamically stable  Respiratory status: acceptable  Hydration status: acceptable  Comments: The patient is ready for PACU discharge. Tina Mckeon DO                   Post anesthesia nausea and vomiting:  controlled      INITIAL Post-op Vital signs:   Vitals Value Taken Time   /74 08/15/22 0950   Temp 36.4 °C (97.5 °F) 08/15/22 0940   Pulse 54 08/15/22 0953   Resp 14 08/15/22 0953   SpO2 98 % 08/15/22 0953   Vitals shown include unvalidated device data.

## 2022-08-15 NOTE — PERIOP NOTES

## 2022-08-15 NOTE — H&P
[address]  [address] [facility], [facility] [address]      History and Physical       NAME:  Amilcar William   :   1945   MRN:   [id number]             History of Present Illness:  Patient is a 69 y.o. who is seen for rectal bleeding . PMH:  Past Medical History:   Diagnosis Date    Colon polyps     Esophageal spasm     Migraine     Osteopenia     Vertigo        PSH:  Past Surgical History:   Procedure Laterality Date    COLONOSCOPY N/A 2019    COLONOSCOPY performed by Sommer Meneses MD at [place] [address], COLON, DIAGNOSTIC      negative; 4 th one    HX ORTHOPAEDIC Right     r knee ACL, removal of loose body    HX ORTHOPAEDIC Right     R wrist surgery after carpal fractures     HX ORTHOPAEDIC Right     rotator cuff repair/ reattached biceps with screw       Allergies: Allergies   Allergen Reactions    Darvocet A500 [Propoxyphene N-Acetaminophen] Other (comments)     headache    Flexeril [Cyclobenzaprine] Vertigo    Pcn [Penicillins] Hives    Percocet [Oxycodone-Acetaminophen] Other (comments)     Headache - severe    Poison Ivy [Vit E-Nonoxynol 9-Aloe Vera] Rash     Severe allergy       Home Medications:  Prior to Admission Medications   Prescriptions Last Dose Informant Patient Reported? Taking?   acetaminophen (TYLENOL) 500 mg tablet 2022  Yes Yes   Sig: Take 1,000 mg by mouth two (2) times a day. calcium-cholecalciferol, d3, 600-125 mg-unit tab 2022  Yes Yes   Sig: Take 1 Tablet by mouth daily. cholecalciferol (VITAMIN D3) 25 mcg (1,000 unit) cap 2022  Yes Yes   Sig: Take 1 Cap by mouth daily. dicyclomine (BENTYL) 10 mg capsule 2022  No Yes   Sig: TAKE ONE CAPSULE BY MOUTH 3 TIMES A DAY AS NEEDED ABDOMINAL PAIN OR DIARRHEA   loratadine (CLARITIN) 10 mg tablet 2022  Yes Yes   Sig: Take 10 mg by mouth.   multivitamin (ONE A DAY) tablet 2022  Yes Yes   Sig: Take 1 Tab by mouth daily.      omega-3 fatty acids-vitamin e 1,000 mg cap 8/14/2022  Yes Yes   Sig: Take 1 Cap by mouth. Facility-Administered Medications: None       Hospital Medications:  Current Facility-Administered Medications   Medication Dose Route Frequency    0.9% sodium chloride infusion  50 mL/hr IntraVENous CONTINUOUS    sodium chloride (NS) flush 5-40 mL  5-40 mL IntraVENous Q8H    sodium chloride (NS) flush 5-40 mL  5-40 mL IntraVENous PRN    midazolam (VERSED) injection 0.25-5 mg  0.25-5 mg IntraVENous Multiple    fentaNYL citrate (PF) injection  mcg   mcg IntraVENous Multiple    naloxone (NARCAN) injection 0.4 mg  0.4 mg IntraVENous Multiple    flumazeniL (ROMAZICON) 0.1 mg/mL injection 0.2 mg  0.2 mg IntraVENous Multiple    simethicone (MYLICON) 26VZ/7.0CY oral drops 80 mg  1.2 mL Oral Multiple    atropine injection 0.5 mg  0.5 mg IntraVENous ONCE PRN    EPINEPHrine (ADRENALIN) 0.1 mg/mL syringe 1 mg  1 mg Endoscopically ONCE PRN     Facility-Administered Medications Ordered in Other Encounters   Medication Dose Route Frequency    0.9% sodium chloride infusion   IntraVENous CONTINUOUS       Social History:  Social History     Tobacco Use    Smoking status: Never    Smokeless tobacco: Never   Substance Use Topics    Alcohol use: Yes     Alcohol/week: 8.0 standard drinks     Types: 1 Glasses of wine, 7 Standard drinks or equivalent per week     Comment: 1 at dinner       Family History:  Family History   Problem Relation Age of Onset    Hypertension Mother     Hypertension Father     Mult Sclerosis Daughter     Other Brother         celiac sprue    Broken Bones Brother         Ankle--football injury    Breast Cancer Paternal Grandmother         Age Late 60's/Early 66's         The patient was counseled at length about the risks of sloane Covid-19 in the vane-operative and post-operative states including the recovery window of their procedure.   The patient was made aware that sloane Covid-19 after a surgical procedure may worsen their prognosis for recovering from the virus and lend to a higher morbidity and or mortality risk. The patient was given the options of postponing their procedure. All of the risks, benefits, and alternatives were discussed. The patient does  wish to proceed with the procedure. Review of Systems:      Constitutional: negative fever, negative chills, negative weight loss  Eyes:   negative visual changes  ENT:   negative sore throat, tongue or lip swelling  Respiratory:  negative cough, negative dyspnea  Cards:  negative for chest pain, palpitations, lower extremity edema  GI:   See HPI  :  negative for frequency, dysuria  Integument:  negative for rash and pruritus  Heme:  negative for easy bruising and gum/nose bleeding  Musculoskel: negative for myalgias,  back pain and muscle weakness  Neuro: negative for headaches, dizziness, vertigo  Psych:  negative for feelings of anxiety, depression       Objective:   Patient Vitals for the past 8 hrs:   BP Temp Pulse Resp SpO2 Height Weight   08/15/22 0801 113/64 98 °F (36.7 °C) (!) 59 16 97 % 5' 4\" (1.626 m) 63.5 kg (140 lb)     No intake/output data recorded. No intake/output data recorded. EXAM:     NEURO-a&o   HEENT-wnl   LUNGS-clear    COR-regular rate and rhythym     ABD-soft , no tenderness, no rebound, good bs     EXT-no edema     Data Review     No results for input(s): WBC, HGB, HCT, PLT, HGBEXT, HCTEXT, PLTEXT in the last 72 hours. No results for input(s): NA, K, CL, CO2, BUN, CREA, GLU, PHOS, CA in the last 72 hours. No results for input(s): AP, TBIL, TP, ALB, GLOB, GGT, AML, LPSE in the last 72 hours. No lab exists for component: SGOT, GPT, AMYP, HLPSE  No results for input(s): INR, PTP, APTT, INREXT in the last 72 hours.        Assessment:     Rectal bleeding  diarrhea     Patient Active Problem List   Diagnosis Code    Allergic dermatitis due to poison ivy L23.7    Vertigo, benign paroxysmal H81.10    Migraine G43.909    Hx of basal cell carcinoma Z85.828    History of colon polyps Z86.010    Esophageal spasm K22.4    Advance directive discussed with patient Z71.89    Chronic bilateral low back pain with right-sided sciatica M54.41, G89.29    PUD (peptic ulcer disease) K27.9    Irritable bowel syndrome with diarrhea K58.0    Primary osteoarthritis of both knees M17.0       Plan:     Endoscopic procedure with sedation     Signed By: Magalys Welch MD     8/15/2022  9:15 AM

## 2022-08-15 NOTE — ANESTHESIA PREPROCEDURE EVALUATION
Relevant Problems   NEUROLOGY   (+) Migraine      GASTROINTESTINAL   (+) PUD (peptic ulcer disease)       Anesthetic History   No history of anesthetic complications            Review of Systems / Medical History  Patient summary reviewed, nursing notes reviewed and pertinent labs reviewed    Pulmonary  Within defined limits                 Neuro/Psych         Headaches     Cardiovascular                  Exercise tolerance: >4 METS     GI/Hepatic/Renal           PUD     Endo/Other             Other Findings              Physical Exam    Airway  Mallampati: I  TM Distance: > 6 cm  Neck ROM: normal range of motion   Mouth opening: Normal     Cardiovascular    Rhythm: regular  Rate: normal         Dental    Dentition: Bridges     Pulmonary  Breath sounds clear to auscultation               Abdominal         Other Findings            Anesthetic Plan    ASA: 2  Anesthesia type: MAC          Induction: Intravenous  Anesthetic plan and risks discussed with: Patient

## 2022-08-15 NOTE — DISCHARGE INSTRUCTIONS
[address]  [address]    COLON DISCHARGE INSTRUCTIONS    Peg Leavitt  [id number]  1945    Discomfort:  Redness at IV site- apply warm compress to area; if redness or soreness persist- contact your physician  There may be a slight amount of blood passed from the rectum  Gaseous discomfort- walking, belching will help relieve any discomfort  You may not operate a vehicle for 12 hours  You may not engage in an occupation involving machinery or appliances for rest of today  You may not drink alcoholic beverages for at least 12 hours  Avoid making any critical decisions for at least 24 hour  DIET:  You may resume your regular diet - however -  remember your colon is empty and a heavy meal will produce gas. Avoid these foods:  vegetables, fried / greasy foods, carbonated drinks     ACTIVITY:  You may  resume your normal daily activities it is recommended that you spend the remainder of the day resting -  avoid any strenuous activity. CALL M.D. ANY SIGN OF:   Increasing pain, nausea, vomiting  Abdominal distension (swelling)  New increased bleeding (oral or rectal)  Fever (chills)  Pain in chest area  Bloody discharge from nose or mouth  Shortness of breath      Follow-up Instructions:   Call Dr. Luke Rosas for any questions or problems at [phone] and follow up in 6 weeks           ENDOSCOPY FINDINGS:   Your colonoscopy showed medium size non bleeding hemorrhoids, rest of exam was normal, biopsies taken to look for any inflammation .   Telephone # [phone]      Signed By: Luke Rosas MD     8/15/2022  9:47 AM       DISCHARGE SUMMARY from Nurse    The following personal items collected during your admission are returned to you:   Dental Appliance: Dental Appliances: None  Vision: Visual Aid: None  Hearing Aid:    Jewelry:    Clothing:    Other Valuables:    Valuables sent to safe:        Learning About Coronavirus (COVID-19)  Coronavirus (COVID-19): Overview  What is coronavirus ([id number])? The coronavirus disease (COVID-19) is caused by a virus. It is an illness that was first found in [place], [place], in December 2019. It has since spread worldwide. The virus can cause fever, cough, and trouble breathing. In severe cases, it can cause pneumonia and make it hard to breathe without help. It can cause death. Coronaviruses are a large group of viruses. They cause the common cold. They also cause more serious illnesses like Middle East respiratory syndrome (MERS) and severe acute respiratory syndrome (SARS). COVID-19 is caused by a novel coronavirus. That means it's a new type that has not been seen in people before. This virus spreads person-to-person through droplets from coughing and sneezing. It can also spread when you are close to someone who is infected. And it can spread when you touch something that has the virus on it, such as a doorknob or a tabletop. What can you do to protect yourself from coronavirus (COVID-19)? The best way to protect yourself from getting sick is to: Avoid areas where there is an outbreak. Avoid contact with people who may be infected. Wash your hands often with soap or alcohol-based hand sanitizers. Avoid crowds and try to stay at least 6 feet away from other people. Wash your hands often, especially after you cough or sneeze. Use soap and water, and scrub for at least 20 seconds. If soap and water aren't available, use an alcohol-based hand . Avoid touching your mouth, nose, and eyes. What can you do to avoid spreading the virus to others? To help avoid spreading the virus to others:  Cover your mouth with a tissue when you cough or sneeze. Then throw the tissue in the trash. Use a disinfectant to clean things that you touch often. Stay home if you are sick or have been exposed to the virus. Don't go to school, work, or public areas. And don't use public transportation.   If you are sick:  Leave your home only if you need to get medical care. But call the doctor's office first so they know you're coming. And wear a face mask, if you have one. If you have a face mask, wear it whenever you're around other people. It can help stop the spread of the virus when you cough or sneeze. Clean and disinfect your home every day. Use household  and disinfectant wipes or sprays. Take special care to clean things that you grab with your hands. These include doorknobs, remote controls, phones, and handles on your refrigerator and microwave. And don't forget countertops, tabletops, bathrooms, and computer keyboards. When to call for help  Call 911 anytime you think you may need emergency care. For example, call if:  You have severe trouble breathing. (You can't talk at all.)  You have constant chest pain or pressure. You are severely dizzy or lightheaded. You are confused or can't think clearly. Your face and lips have a blue color. You pass out (lose consciousness) or are very hard to wake up. Call your doctor now if you develop symptoms such as:  Shortness of breath. Fever. Cough. If you need to get care, call ahead to the doctor's office for instructions before you go. Make sure you wear a face mask, if you have one, to prevent exposing other people to the virus. Where can you get the latest information? The following health organizations are tracking and studying this virus. Their websites contain the most up-to-date information. Mercedes Carly also learn what to do if you think you may have been exposed to the virus. U.S. Centers for Disease Control and Prevention (CDC): The CDC provides updated news about the disease and travel advice. The website also tells you how to prevent the spread of infection. www.cdc.gov  World Health Organization [facility]): WHO offers information about the virus outbreaks.  WHO also has travel advice. www.who.int  Current as of: April 1, 2020               Content Version: 12.4  © 2018-5875 [employer]. Care instructions adapted under license by your healthcare professional. If you have questions about a medical condition or this instruction, always ask your healthcare professional. [address] any warranty or liability for your use of this information.

## 2022-08-15 NOTE — PROCEDURES
[address] [place] [address], [address] [place] [address]      Colonoscopy Operative Report    Tisha Underwood  [id number]  1945      Procedure Type:   Colonoscopy with biopsy     Indications:    Diarrhea, Hematochezia/melena , h/o lymphocytic colitis and hemorrhoids surgery       Pre-operative Diagnosis: see indication above    Post-operative Diagnosis:  See findings below    :  Shalini Gonzalez MD    Surgical Assistant: Endoscopy Technician-1: Jeff Savannah  Endoscopy RN-1: Anup Joy RN    Implants:  None    Referring Provider: Lior Quintana MD      Sedation:  MAC anesthesia Propofol      Procedure Details:  After informed consent was obtained with all risks and benefits of procedure explained and preoperative exam completed, the patient was taken to the endoscopy suite and placed in the left lateral decubitus position. Upon sequential sedation as per above, a digital rectal exam was performed demonstrating internal hemorrhoids. The Olympus videocolonoscope  was inserted in the rectum and carefully advanced to the cecum, which was identified by the ileocecal valve and appendiceal orifice, terminal ileum. The cecum was identified by the ileocecal valve and appendiceal orifice. The quality of preparation was good. The colonoscope was slowly withdrawn with careful evaluation between folds. Retroflexion in the rectum was completed . Findings:   Rectum: normal  Grade 2 internal hemorrhoids  Sigmoid: normal  Descending Colon: normal  Transverse Colon: normal  Ascending Colon: normal  Cecum: normal  Terminal Ileum: normal    Random colonic biopsies taken       Specimen Removed:   as above    Complications: None. EBL:  None. Impression:     see findings    Recommendations: --Await pathology.       Recommendation for next colonscopy in 5 years  Follow up in 2 months   Signed By: Shalini Gonzalez MD     8/15/2022  9:44 AM

## 2022-08-19 ENCOUNTER — OFFICE VISIT (OUTPATIENT)
Dept: INTERNAL MEDICINE CLINIC | Age: 77
End: 2022-08-19
Payer: MEDICARE

## 2022-08-19 VITALS
BODY MASS INDEX: 21.85 KG/M2 | OXYGEN SATURATION: 95 % | RESPIRATION RATE: 18 BRPM | HEART RATE: 52 BPM | WEIGHT: 128 LBS | TEMPERATURE: 98.3 F | SYSTOLIC BLOOD PRESSURE: 102 MMHG | HEIGHT: 64 IN | DIASTOLIC BLOOD PRESSURE: 65 MMHG

## 2022-08-19 DIAGNOSIS — R41.3 MEMORY LOSS: Primary | ICD-10-CM

## 2022-08-19 DIAGNOSIS — R53.82 CHRONIC FATIGUE: ICD-10-CM

## 2022-08-19 DIAGNOSIS — R41.3 MEMORY LOSS: ICD-10-CM

## 2022-08-19 DIAGNOSIS — K58.0 IRRITABLE BOWEL SYNDROME WITH DIARRHEA: ICD-10-CM

## 2022-08-19 DIAGNOSIS — E55.9 VITAMIN D DEFICIENCY: ICD-10-CM

## 2022-08-19 DIAGNOSIS — R23.8 SCALP IRRITATION: ICD-10-CM

## 2022-08-19 DIAGNOSIS — D22.9 ATYPICAL MOLE: ICD-10-CM

## 2022-08-19 DIAGNOSIS — F41.9 ANXIETY: ICD-10-CM

## 2022-08-19 PROCEDURE — G8420 CALC BMI NORM PARAMETERS: HCPCS | Performed by: INTERNAL MEDICINE

## 2022-08-19 PROCEDURE — G8510 SCR DEP NEG, NO PLAN REQD: HCPCS | Performed by: INTERNAL MEDICINE

## 2022-08-19 PROCEDURE — 1101F PT FALLS ASSESS-DOCD LE1/YR: CPT | Performed by: INTERNAL MEDICINE

## 2022-08-19 PROCEDURE — G0463 HOSPITAL OUTPT CLINIC VISIT: HCPCS | Performed by: INTERNAL MEDICINE

## 2022-08-19 PROCEDURE — G8536 NO DOC ELDER MAL SCRN: HCPCS | Performed by: INTERNAL MEDICINE

## 2022-08-19 PROCEDURE — G8399 PT W/DXA RESULTS DOCUMENT: HCPCS | Performed by: INTERNAL MEDICINE

## 2022-08-19 PROCEDURE — 1090F PRES/ABSN URINE INCON ASSESS: CPT | Performed by: INTERNAL MEDICINE

## 2022-08-19 PROCEDURE — 99214 OFFICE O/P EST MOD 30 MIN: CPT | Performed by: INTERNAL MEDICINE

## 2022-08-19 PROCEDURE — G8427 DOCREV CUR MEDS BY ELIG CLIN: HCPCS | Performed by: INTERNAL MEDICINE

## 2022-08-19 RX ORDER — HYDROCORTISONE ACETATE 25 MG/1
25 SUPPOSITORY RECTAL EVERY 12 HOURS
Qty: 30 EACH | Refills: 1 | Status: SHIPPED | OUTPATIENT
Start: 2022-08-19 | End: 2022-08-24 | Stop reason: SDUPTHER

## 2022-08-19 NOTE — PROGRESS NOTES
Chato Mccoy is a 69 y.o. female    Chief Complaint   Patient presents with    Memory Loss     Short term; \"when anxious\"    Diarrhea     GI issues and internal hemorrhoids that bleed      Alopecia     Saw dermatologist and was put on Rogaine but caused itchiness    Cataract     Vision changes bothering pt and discuss fungus in toenails       Visit Vitals  /65   Pulse (!) 52   Temp 98.3 °F (36.8 °C) (Temporal)   Resp 18   Ht 5' 4\" (1.626 m)   Wt 128 lb (58.1 kg)   SpO2 95%   BMI 21.97 kg/m²       3 most recent PHQ Screens 8/19/2022   Little interest or pleasure in doing things Not at all   Feeling down, depressed, irritable, or hopeless Not at all   Total Score PHQ 2 0       Fall Risk Assessment, last 12 mths 8/19/2022   Able to walk? Yes   Fall in past 12 months? 0   Do you feel unsteady? -   Are you worried about falling -   Number of falls in past 12 months -   Fall with injury? -       Abuse Screening Questionnaire 8/19/2019   Do you ever feel afraid of your partner? N   Are you in a relationship with someone who physically or mentally threatens you? N   Is it safe for you to go home? Y       1. Have you been to the ER, urgent care clinic since your last visit? Hospitalized since your last visit? No     2. Have you seen or consulted any other health care providers outside of the [address] [place] [address] since your last visit? Include any pap smears or colon screening.  No

## 2022-08-19 NOTE — PROGRESS NOTES
HISTORY OF PRESENT ILLNESS  Vania Lazcano is a 69 y.o. female. HPI  Here for concerns of short term memory loss.  very worried about her memory loss. Word finding issues , reading a traditional watch, more issues with numbers. Causing increased anxiety. Pt feels if decreases anxiety, memory is better. Diarrhea - recent colonoscopy with Dr. Henrique Valladares, showing hemorrhoids but otherwise normal.  Biopsies were normal.  Fodmap diet has been helping. Has not been using anything for her hemorrhoids. Hemorrhoids have been banded in the past by Dr. Garima James. Diarrhea tends to be triggered when goes out to eat. Can last for up to 10-14 days. Will take dicyclomine which helps but will only take 1 per day. More tired for the last 1-2 months. Not eating as much. Sleeping ok. For 1 week increased bumps/bites - small and red.  + itching. Using otc hydrocortisone. Black spots on right lower leg - 3 on right lower lateral leg. Similar areas burnt off in past.  Had seen Dr. Clayton Sinclair but would like to see someone else  Hair loss - rxed Rogaine but had intense scalp itching. Very helpful but had to stop approx 2 months ago. Left ring finger - increased PIP joint. Stiffness but no pain. Cataracts - told not \"ripe\" but now having issues reading. To see Dr. Syl Pleitez with [facility]. MMSe 20/30. Current Outpatient Medications:     OTHER, Calcium injections, Disp: , Rfl:     loratadine (CLARITIN) 10 mg tablet, Take 10 mg by mouth., Disp: , Rfl:     acetaminophen (TYLENOL) 500 mg tablet, Take 1,000 mg by mouth two (2) times a day., Disp: , Rfl:     dicyclomine (BENTYL) 10 mg capsule, TAKE ONE CAPSULE BY MOUTH 3 TIMES A DAY AS NEEDED ABDOMINAL PAIN OR DIARRHEA, Disp: 30 Cap, Rfl: 3    calcium-cholecalciferol, d3, 600-125 mg-unit tab, Take 1 Tablet by mouth daily. , Disp: , Rfl:     cholecalciferol (VITAMIN D3) 25 mcg (1,000 unit) cap, Take 1 Cap by mouth daily. , Disp: , Rfl:     omega-3 fatty acids-vitamin e 1,000 mg cap, Take 1 Cap by mouth., Disp: , Rfl:     multivitamin (ONE A DAY) tablet, Take 1 Tab by mouth daily. , Disp: , Rfl:     Visit Vitals  /65   Pulse (!) 52   Temp 98.3 °F (36.8 °C) (Temporal)   Resp 18   Ht 5' 4\" (1.626 m)   Wt 128 lb (58.1 kg)   SpO2 95%   BMI 21.97 kg/m²       ROS  See above  Physical Exam  Vitals reviewed. Constitutional:       Appearance: Normal appearance. HENT:      Head: Normocephalic and atraumatic. Neck:      Vascular: No carotid bruit. Cardiovascular:      Rate and Rhythm: Normal rate and regular rhythm. Pulses: Normal pulses. Heart sounds: Normal heart sounds. Pulmonary:      Effort: Pulmonary effort is normal.      Breath sounds: Normal breath sounds. Abdominal:      General: Bowel sounds are normal. There is no distension. Palpations: Abdomen is soft. There is no mass. Tenderness: There is no abdominal tenderness. Musculoskeletal:      Cervical back: Neck supple. Right lower leg: No edema. Left lower leg: No edema. Lymphadenopathy:      Cervical: No cervical adenopathy. Neurological:      Mental Status: She is alert and oriented to person, place, and time. Comments: MMSE 20/30  Unsuccessful clock drawing. ASSESSMENT and PLAN  Diagnoses and all orders for this visit:    1. Memory loss - MMSE 20/30. Check for other causes. If no cause found will start Aricept. Consider full neuropsych testing.    -     MRI BRAIN WO CONT; Future  -     VITAMIN B12; Future  -     VITAMIN D, 25 HYDROXY; Future  -     TSH 3RD GENERATION; Future  -     METABOLIC PANEL, COMPREHENSIVE; Future    2. Irritable bowel syndrome with diarrhea - encouraged her to take bentyl prn.      3. Anxiety - secondary to memory loss. Will follow    4. Chronic fatigue - again most likely tied to memory loss. Continue check labs for other causes.     -     VITAMIN B12; Future  -     VITAMIN D, 25 HYDROXY; Future  -     TSH 3RD GENERATION; Future  -     METABOLIC PANEL, COMPREHENSIVE; Future    5. Scalp irritation - post rogaine  -     REFERRAL TO DERMATOLOGY    6. Atypical mole  -     REFERRAL TO DERMATOLOGY    7. Vitamin D deficiency - controlled, cont supplements. Repeat labs. -     VITAMIN D, 25 HYDROXY; Future    8. Hemorrhoids - secondary to IBS  -     hydrocortisone (ANUSOL-HC) 25 mg supp; Insert 1 Suppository into rectum every twelve (12) hours. As needed for hemorrhoids      Follow-up and Dispositions    Return in about 4 weeks (around 9/16/2022).

## 2022-08-20 LAB
25(OH)D3 SERPL-MCNC: 55.4 NG/ML (ref 30–100)
ALBUMIN SERPL-MCNC: 4 G/DL (ref 3.5–5)
ALBUMIN/GLOB SERPL: 1.4 {RATIO} (ref 1.1–2.2)
ALP SERPL-CCNC: 38 U/L (ref 45–117)
ALT SERPL-CCNC: 28 U/L (ref 12–78)
ANION GAP SERPL CALC-SCNC: 3 MMOL/L (ref 5–15)
AST SERPL-CCNC: 24 U/L (ref 15–37)
BILIRUB SERPL-MCNC: 0.5 MG/DL (ref 0.2–1)
BUN SERPL-MCNC: 17 MG/DL (ref 6–20)
BUN/CREAT SERPL: 24 (ref 12–20)
CALCIUM SERPL-MCNC: 9.6 MG/DL (ref 8.5–10.1)
CHLORIDE SERPL-SCNC: 107 MMOL/L (ref 97–108)
CO2 SERPL-SCNC: 30 MMOL/L (ref 21–32)
CREAT SERPL-MCNC: 0.7 MG/DL (ref 0.55–1.02)
GLOBULIN SER CALC-MCNC: 2.9 G/DL (ref 2–4)
GLUCOSE SERPL-MCNC: 158 MG/DL (ref 65–100)
POTASSIUM SERPL-SCNC: 4.3 MMOL/L (ref 3.5–5.1)
PROT SERPL-MCNC: 6.9 G/DL (ref 6.4–8.2)
SODIUM SERPL-SCNC: 140 MMOL/L (ref 136–145)
TSH SERPL DL<=0.05 MIU/L-ACNC: 1.22 UIU/ML (ref 0.36–3.74)
VIT B12 SERPL-MCNC: 733 PG/ML (ref 193–986)

## 2022-08-22 NOTE — TELEPHONE ENCOUNTER
Pharmacy comments: Alternative requested: Not covered, its $$    hydrocortisone (ANUSOL-HC) 25 mg supp   08/19/22  --  Akua Decker MD     Insert 1 Suppository into rectum every twelve (12) hours.  As needed for hemorrhoids

## 2022-08-24 RX ORDER — HYDROCORTISONE ACETATE 25 MG/1
25 SUPPOSITORY RECTAL EVERY 12 HOURS
Qty: 30 EACH | Refills: 1 | Status: SHIPPED | OUTPATIENT
Start: 2022-08-24 | End: 2022-09-30 | Stop reason: ALTCHOICE

## 2022-09-06 ENCOUNTER — HOSPITAL ENCOUNTER (OUTPATIENT)
Dept: MRI IMAGING | Age: 77
Discharge: HOME | End: 2022-09-06
Attending: INTERNAL MEDICINE
Payer: MEDICARE

## 2022-09-06 DIAGNOSIS — R41.3 MEMORY LOSS: ICD-10-CM

## 2022-09-06 PROCEDURE — 70551 MRI BRAIN STEM W/O DYE: CPT

## 2022-09-08 ENCOUNTER — TELEPHONE (OUTPATIENT)
Dept: INTERNAL MEDICINE CLINIC | Age: 77
End: 2022-09-08

## 2022-09-08 NOTE — TELEPHONE ENCOUNTER
----- Message from Sean Lynn sent at 9/7/2022  1:51 PM EDT -----  Regarding: FW: MRI test results     ----- Message -----  From: Katarzyna Ayoub  Sent: 9/7/2022  11:01 AM EDT  To: [facility]  Subject: MRI test results                                 This is from Jeannette Salvador. Given the test results should we meet sooner than September 27? If so, your  can call me at [phone].  Thanks,  Jeannette Salvador

## 2022-09-19 ENCOUNTER — HOSPITAL ENCOUNTER (OUTPATIENT)
Dept: INFUSION THERAPY | Age: 77
Discharge: HOME | End: 2022-09-19
Payer: MEDICARE

## 2022-09-19 VITALS
RESPIRATION RATE: 18 BRPM | TEMPERATURE: 97.3 F | HEART RATE: 54 BPM | DIASTOLIC BLOOD PRESSURE: 68 MMHG | SYSTOLIC BLOOD PRESSURE: 112 MMHG

## 2022-09-19 LAB
ALBUMIN SERPL-MCNC: 3.7 G/DL (ref 3.5–5)
ANION GAP SERPL CALC-SCNC: 1 MMOL/L (ref 5–15)
BUN SERPL-MCNC: 19 MG/DL (ref 6–20)
BUN/CREAT SERPL: 28 (ref 12–20)
CALCIUM SERPL-MCNC: 9.3 MG/DL (ref 8.5–10.1)
CHLORIDE SERPL-SCNC: 107 MMOL/L (ref 97–108)
CO2 SERPL-SCNC: 32 MMOL/L (ref 21–32)
CREAT SERPL-MCNC: 0.68 MG/DL (ref 0.55–1.02)
GLUCOSE SERPL-MCNC: 75 MG/DL (ref 65–100)
MAGNESIUM SERPL-MCNC: 2.2 MG/DL (ref 1.6–2.4)
PHOSPHATE SERPL-MCNC: 2.9 MG/DL (ref 2.6–4.7)
PHOSPHATE SERPL-MCNC: 3 MG/DL (ref 2.6–4.7)
POTASSIUM SERPL-SCNC: 4.1 MMOL/L (ref 3.5–5.1)
SODIUM SERPL-SCNC: 140 MMOL/L (ref 136–145)

## 2022-09-19 PROCEDURE — 96372 THER/PROPH/DIAG INJ SC/IM: CPT

## 2022-09-19 PROCEDURE — 74011250636 HC RX REV CODE- 250/636: Performed by: INTERNAL MEDICINE

## 2022-09-19 PROCEDURE — 84100 ASSAY OF PHOSPHORUS: CPT

## 2022-09-19 PROCEDURE — 83735 ASSAY OF MAGNESIUM: CPT

## 2022-09-19 PROCEDURE — 80069 RENAL FUNCTION PANEL: CPT

## 2022-09-19 PROCEDURE — 36415 COLL VENOUS BLD VENIPUNCTURE: CPT

## 2022-09-19 RX ADMIN — DENOSUMAB 60 MG: 60 INJECTION SUBCUTANEOUS at 09:46

## 2022-09-19 NOTE — PROGRESS NOTES
[facility] Short Note                       Date: 2022    Name: Thine Mak    MRN: [id number]         : 1945      Pt admit to [facility] for Prolia ambulatory in stable condition. Assessment completed and documented in flowsheets. No acute concerns at this time. Please review pending lab results in [address] [place] [address]. Ms. Rogerio Manuel vitals were reviewed:  Patient Vitals for the past 12 hrs:   Temp Pulse Resp BP   22 0930 97.3 °F (36.3 °C) (!) 54 18 112/68         Lab results were obtained and reviewed. Labs within parameter for treatment. No results found for this or any previous visit (from the past 12 hour(s)). Medications given: given R arm SQ  Medications Administered       denosumab (PROLIA) injection 60 mg       Admin Date  2022 Action  Given Dose  60 mg Route  SubCUTAneous Administered By  Chantal Rg RN                 Medication education reinforced/ hand out provided and pt verbalized understanding. Ms. Ashutosh Jay tolerated the injection and was discharged from [place] [address] in stable condition. Patient is aware if future appointments.     Future Appointments   Date Time Provider Lana Snyder   2022  1:40 PM MD RICHARD Gonsalez BS BHUMI Celaya, SAULO  2022  10:02 AM    Problem: Knowledge Deficit  Goal: *Verbalizes understanding of procedures and medications  Outcome: Progressing Towards Goal     Problem: Patient Education:  Go to Education Activity  Goal: Patient/Family Education  Outcome: Progressing Towards Goal

## 2022-09-27 ENCOUNTER — OFFICE VISIT (OUTPATIENT)
Dept: INTERNAL MEDICINE CLINIC | Age: 77
End: 2022-09-27

## 2022-09-27 VITALS — HEIGHT: 64 IN | TEMPERATURE: 97.6 F | RESPIRATION RATE: 16 BRPM | WEIGHT: 126.4 LBS | BODY MASS INDEX: 21.58 KG/M2

## 2022-09-30 ENCOUNTER — OFFICE VISIT (OUTPATIENT)
Dept: INTERNAL MEDICINE CLINIC | Age: 77
End: 2022-09-30
Payer: MEDICARE

## 2022-09-30 VITALS
TEMPERATURE: 98.3 F | OXYGEN SATURATION: 99 % | WEIGHT: 127.4 LBS | RESPIRATION RATE: 16 BRPM | BODY MASS INDEX: 21.75 KG/M2 | HEART RATE: 59 BPM | DIASTOLIC BLOOD PRESSURE: 73 MMHG | HEIGHT: 64 IN | SYSTOLIC BLOOD PRESSURE: 119 MMHG

## 2022-09-30 DIAGNOSIS — G30.9 ALZHEIMER'S DISEASE, UNSPECIFIED (CODE) (HCC): Primary | ICD-10-CM

## 2022-09-30 DIAGNOSIS — R41.3 MEMORY LOSS: ICD-10-CM

## 2022-09-30 PROCEDURE — 1090F PRES/ABSN URINE INCON ASSESS: CPT | Performed by: INTERNAL MEDICINE

## 2022-09-30 PROCEDURE — G8420 CALC BMI NORM PARAMETERS: HCPCS | Performed by: INTERNAL MEDICINE

## 2022-09-30 PROCEDURE — G8427 DOCREV CUR MEDS BY ELIG CLIN: HCPCS | Performed by: INTERNAL MEDICINE

## 2022-09-30 PROCEDURE — G8536 NO DOC ELDER MAL SCRN: HCPCS | Performed by: INTERNAL MEDICINE

## 2022-09-30 PROCEDURE — 1101F PT FALLS ASSESS-DOCD LE1/YR: CPT | Performed by: INTERNAL MEDICINE

## 2022-09-30 PROCEDURE — G0463 HOSPITAL OUTPT CLINIC VISIT: HCPCS | Performed by: INTERNAL MEDICINE

## 2022-09-30 PROCEDURE — G8399 PT W/DXA RESULTS DOCUMENT: HCPCS | Performed by: INTERNAL MEDICINE

## 2022-09-30 PROCEDURE — G8510 SCR DEP NEG, NO PLAN REQD: HCPCS | Performed by: INTERNAL MEDICINE

## 2022-09-30 PROCEDURE — 99213 OFFICE O/P EST LOW 20 MIN: CPT | Performed by: INTERNAL MEDICINE

## 2022-09-30 RX ORDER — DONEPEZIL HYDROCHLORIDE 10 MG/1
10 TABLET, FILM COATED ORAL
Qty: 30 TABLET | Refills: 0 | Status: SHIPPED | OUTPATIENT
Start: 2022-09-30

## 2022-09-30 RX ORDER — DONEPEZIL HYDROCHLORIDE 5 MG/1
5 TABLET, FILM COATED ORAL
Qty: 30 TABLET | Refills: 0 | Status: SHIPPED | OUTPATIENT
Start: 2022-09-30

## 2022-09-30 NOTE — PROGRESS NOTES
HISTORY OF PRESENT ILLNESS  Vania Fitzgerald is a 69 y.o. female. HPI  Here for follow up of dementia. Seein last month. Labs without cause for memory loss. MRI consistent with Alzheimers. Last visit MMSE 20/30. She is here with her  to discuss results and treatment       Current Outpatient Medications:     donepeziL (ARICEPT) 5 mg tablet, Take 1 Tablet by mouth nightly. For 1st month., Disp: 30 Tablet, Rfl: 0    donepeziL (ARICEPT) 10 mg tablet, Take 1 Tablet by mouth nightly. Start month 2 and continue, Disp: 30 Tablet, Rfl: 0    hydrocortisone (ANUSOL-HC) 25 mg supp, Insert 1 Suppository into rectum every twelve (12) hours. As needed for hemorrhoids, Disp: 30 Each, Rfl: 1    OTHER, Calcium injections, Disp: , Rfl:     loratadine (CLARITIN) 10 mg tablet, Take 10 mg by mouth., Disp: , Rfl:     acetaminophen (TYLENOL) 500 mg tablet, Take 1,000 mg by mouth two (2) times a day., Disp: , Rfl:     dicyclomine (BENTYL) 10 mg capsule, TAKE ONE CAPSULE BY MOUTH 3 TIMES A DAY AS NEEDED ABDOMINAL PAIN OR DIARRHEA, Disp: 30 Cap, Rfl: 3    calcium-cholecalciferol, d3, 600-125 mg-unit tab, Take 1 Tablet by mouth daily. , Disp: , Rfl:     cholecalciferol (VITAMIN D3) 25 mcg (1,000 unit) cap, Take 1 Cap by mouth daily. , Disp: , Rfl:     omega-3 fatty acids-vitamin e 1,000 mg cap, Take 1 Cap by mouth., Disp: , Rfl:     multivitamin (ONE A DAY) tablet, Take 1 Tab by mouth daily. , Disp: , Rfl:     Visit Vitals  /73 (BP 1 Location: Left upper arm, BP Patient Position: Sitting, BP Cuff Size: Adult)   Pulse (!) 59   Temp 98.3 °F (36.8 °C) (Temporal)   Resp 16   Ht 5' 4\" (1.626 m)   Wt 127 lb 6.4 oz (57.8 kg)   SpO2 99%   BMI 21.87 kg/m²       ROS  See above  Physical Exam  Vitals reviewed. Constitutional:       Appearance: Normal appearance. HENT:      Head: Normocephalic and atraumatic. Neurological:      Mental Status: She is alert. ASSESSMENT and PLAN  Diagnoses and all orders for this visit:     1. Memory loss from 2. Alzheimer's disease, unspecified  - start ARiCEPT. Referred to peuropsych for full testing.    -     REFERRAL TO NEUROPSYCHOLOGY  -     donepeziL (ARICEPT) 5 mg tablet; Take 1 Tablet by mouth nightly. For 1st month. -     donepeziL (ARICEPT) 10 mg tablet; Take 1 Tablet by mouth nightly. Start month 2 and continue    F/up 6-8 weeks.

## 2022-10-03 ENCOUNTER — TELEPHONE (OUTPATIENT)
Dept: INTERNAL MEDICINE CLINIC | Age: 77
End: 2022-10-03

## 2022-10-03 NOTE — TELEPHONE ENCOUNTER
Patient's  called to say that the neuropsychologist that Ms. Davis was referred to is not able to see her for another six months. He is wondering if there is another neuropsychologist that she could be referred to that could see her sooner. He states that they will out of town in [facility] from Saturday 10/8 of this week to 10/26, and so while they would like her to be seen this week if possible, anytime at the end of this month or next month would also work well for them if possible.

## 2022-10-04 ENCOUNTER — TELEPHONE (OUTPATIENT)
Dept: INTERNAL MEDICINE CLINIC | Age: 77
End: 2022-10-04

## 2022-10-04 NOTE — TELEPHONE ENCOUNTER
Patient's  called to ask if they could get z-packs since they are travelling on Saturday. He also reported that they are still having trouble finding a neuropsychologist so any further suggestions would be appreciated.

## 2022-10-05 ENCOUNTER — OFFICE VISIT (OUTPATIENT)
Dept: ORTHOPEDIC SURGERY | Age: 77
End: 2022-10-05
Payer: MEDICARE

## 2022-10-05 ENCOUNTER — TELEPHONE (OUTPATIENT)
Dept: INTERNAL MEDICINE CLINIC | Age: 77
End: 2022-10-05

## 2022-10-05 VITALS — WEIGHT: 125 LBS | HEIGHT: 64 IN | BODY MASS INDEX: 21.34 KG/M2

## 2022-10-05 DIAGNOSIS — M25.561 ACUTE PAIN OF RIGHT KNEE: Primary | ICD-10-CM

## 2022-10-05 DIAGNOSIS — M12.561 TRAUMATIC ARTHRITIS OF RIGHT KNEE: ICD-10-CM

## 2022-10-05 PROCEDURE — G8399 PT W/DXA RESULTS DOCUMENT: HCPCS | Performed by: ORTHOPAEDIC SURGERY

## 2022-10-05 PROCEDURE — G8432 DEP SCR NOT DOC, RNG: HCPCS | Performed by: ORTHOPAEDIC SURGERY

## 2022-10-05 PROCEDURE — 1090F PRES/ABSN URINE INCON ASSESS: CPT | Performed by: ORTHOPAEDIC SURGERY

## 2022-10-05 PROCEDURE — G8420 CALC BMI NORM PARAMETERS: HCPCS | Performed by: ORTHOPAEDIC SURGERY

## 2022-10-05 PROCEDURE — 99203 OFFICE O/P NEW LOW 30 MIN: CPT | Performed by: ORTHOPAEDIC SURGERY

## 2022-10-05 PROCEDURE — 1123F ACP DISCUSS/DSCN MKR DOCD: CPT | Performed by: ORTHOPAEDIC SURGERY

## 2022-10-05 PROCEDURE — 20610 DRAIN/INJ JOINT/BURSA W/O US: CPT | Performed by: ORTHOPAEDIC SURGERY

## 2022-10-05 PROCEDURE — G8427 DOCREV CUR MEDS BY ELIG CLIN: HCPCS | Performed by: ORTHOPAEDIC SURGERY

## 2022-10-05 PROCEDURE — G8536 NO DOC ELDER MAL SCRN: HCPCS | Performed by: ORTHOPAEDIC SURGERY

## 2022-10-05 PROCEDURE — 1101F PT FALLS ASSESS-DOCD LE1/YR: CPT | Performed by: ORTHOPAEDIC SURGERY

## 2022-10-05 RX ORDER — AZITHROMYCIN 250 MG/1
250 TABLET, FILM COATED ORAL SEE ADMIN INSTRUCTIONS
Qty: 6 TABLET | Refills: 0 | Status: SHIPPED | OUTPATIENT
Start: 2022-10-05 | End: 2022-10-10

## 2022-10-05 RX ORDER — TRIAMCINOLONE ACETONIDE 40 MG/ML
80 INJECTION, SUSPENSION INTRA-ARTICULAR; INTRAMUSCULAR ONCE
Status: COMPLETED | OUTPATIENT
Start: 2022-10-05 | End: 2022-10-05

## 2022-10-05 RX ADMIN — TRIAMCINOLONE ACETONIDE 80 MG: 40 INJECTION, SUSPENSION INTRA-ARTICULAR; INTRAMUSCULAR at 17:29

## 2022-10-05 NOTE — PROGRESS NOTES
Vania Chawla (: 1945) is a 69 y.o. female, patient, here for evaluation of the following chief complaint(s):  Knee Pain (Right knee pain)       HPI:    Patient presents to the office today with a chief complaint of right knee pain. This is a patient who is had a prior surgical history of her right knee. As of lately, her knee has become more more problematic. It is escalated here recently. She enjoys golf. She describes discomfort that is mostly located across the outer aspect of the knee. She has not noted any swelling. She denies giving way. She is here today with her . Allergies   Allergen Reactions    Darvocet A500 [Propoxyphene N-Acetaminophen] Other (comments)     headache    Flexeril [Cyclobenzaprine] Vertigo    Pcn [Penicillins] Hives    Percocet [Oxycodone-Acetaminophen] Other (comments)     Headache - severe    Poison Ivy [Vit E-Nonoxynol 9-Aloe Vera] Rash     Severe allergy       Current Outpatient Medications   Medication Sig    donepeziL (ARICEPT) 5 mg tablet Take 1 Tablet by mouth nightly. For 1st month. donepeziL (ARICEPT) 10 mg tablet Take 1 Tablet by mouth nightly. Start month 2 and continue    OTHER Calcium injections    loratadine (CLARITIN) 10 mg tablet Take 10 mg by mouth. acetaminophen (TYLENOL) 500 mg tablet Take 1,000 mg by mouth two (2) times a day. dicyclomine (BENTYL) 10 mg capsule TAKE ONE CAPSULE BY MOUTH 3 TIMES A DAY AS NEEDED ABDOMINAL PAIN OR DIARRHEA    calcium-cholecalciferol, d3, 600-125 mg-unit tab Take 1 Tablet by mouth daily. cholecalciferol (VITAMIN D3) 25 mcg (1,000 unit) cap Take 1 Cap by mouth daily. omega-3 fatty acids-vitamin e 1,000 mg cap Take 1 Cap by mouth.    multivitamin (ONE A DAY) tablet Take 1 Tab by mouth daily. No current facility-administered medications for this visit.        Past Medical History:   Diagnosis Date    Colon polyps     Esophageal spasm     Migraine     Osteopenia     Vertigo         Past Surgical History:   Procedure Laterality Date    COLONOSCOPY N/A 7/2/2019    COLONOSCOPY performed by Danna Figueroa MD at [facility] ENDOSCOPY    COLONOSCOPY N/A 8/15/2022    COLONOSCOPY performed by Danna Figueroa MD at [address] [place] [address], COLON, DIAGNOSTIC  2012    negative; 4 th one    HX ORTHOPAEDIC Right 2001    r knee ACL, removal of loose body    HX ORTHOPAEDIC Right 1995    R wrist surgery after carpal fractures     HX ORTHOPAEDIC Right 9-2014    rotator cuff repair/ reattached biceps with screw       Family History   Problem Relation Age of Onset    Hypertension Mother     Hypertension Father     Mult Sclerosis Daughter     Other Brother         celiac sprue    Broken Bones Brother         Ankle--football injury    Breast Cancer Paternal Grandmother         Age Late 60's/Early 66's        Social History     Socioeconomic History    Marital status:      Spouse name: Not on file    Number of children: Not on file    Years of education: Not on file    Highest education level: Not on file   Occupational History    Not on file   Tobacco Use    Smoking status: Never    Smokeless tobacco: Never   Substance and Sexual Activity    Alcohol use: Yes     Alcohol/week: 8.0 standard drinks     Types: 1 Glasses of wine, 7 Standard drinks or equivalent per week     Comment: 1 at dinner    Drug use: No    Sexual activity: Yes     Partners: Male   Other Topics Concern    Not on file   Social History Narrative    Not on file     Social Determinants of Health     Financial Resource Strain: Not on file   Food Insecurity: Not on file   Transportation Needs: Not on file   Physical Activity: Not on file   Stress: Not on file   Social Connections: Not on file   Intimate Partner Violence: Not on file   Housing Stability: Not on file       Review of Systems   Musculoskeletal:         Right knee pain     Vitals:  Ht 5' 4\" (1.626 m)   Wt 125 lb (56.7 kg)   BMI 21.46 kg/m²    Body mass index is 21.46 kg/m².     Ortho Exam     Patient is alert and oriented x3. Patient is in no acute distress. Patient ambulates with a nonantalgic gait. Right knee: Incisions from prior surgery of healed well. No effusion. Range of motion 0-122 degrees. She has crepitation particular at the patellofemoral joint. Lateral joint tenderness not necessarily worsen with Ayad's maneuver. She also carries medial joint tenderness. Collateral ligaments are intact. Anterior drawer and posterior drawer negative. There is no swelling noted distally. Neurovascular examination is intact. Left knee: no abrasions, lacerations, ecchymosis or soft tissue swelling. No effusion is identified. There is no pain to palpation along the medial or lateral border of the patella. There is no pain or crepitation with manipulation of the patella. There is normal excursion of the patella. Patellar grind test is negative. Active and passive range of motion is full and does not cause pain or crepitation. There is no pain with palpation along the medial femoral epicondyle or medial tibia and no pain with palpation over the lateral femoral epicondyle. There is no medial or lateral joint line tenderness. Ayad's maneuver is negative. There is no collateral ligament instability. Anterior drawer, Lachman and posterior drawer are negative. There is no soft tissue swelling distally into the leg. XR Results (most recent):  Results from Appointment encounter on 10/05/22    XR KNEE RT MIN 4 V    Narrative  4 view x-ray of the right knee reveals narrowing of the medial joint space. There is retained hardware that is noted from anterior cruciate ligament reconstruction. 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 However, she does have some joint line tenderness.   Discussing treatment options, I believe it is reasonable to consider injection treatment of the knee. She agrees to this. If this is not very helpful and/or short-lived, an MRI evaluation may be required to evaluate lateral meniscus pathology. Consent for the injection was obtained. Risk of postinjection infection, lack of improvement, hypopigmentation and unusual allergic reaction were explained to the patient. After consent, the skin was sterilely prepped and 80 mg of triamcinolone and 5 cc of 0.25% plain Marcaine was was injected in the right knee. Patient had no complications. Patient is to ice modify activities for 24 hours.   Patient is to return to the office if no improvement        Zak Caballero MD

## 2022-10-05 NOTE — LETTER
10/5/2022    Patient: Suresh Buck   YOB: 1945   Date of Visit: 10/5/2022     Lisseth Denton MD  [id number]  [place]    Dear Lisseth Denton MD,      Thank you for referring Ms. Vania Davis to [facility] for evaluation. My notes for this consultation are attached. If you have questions, please do not hesitate to call me. I look forward to following your patient along with you.       Sincerely,    Umesh Rubin MD

## 2022-10-07 ENCOUNTER — TELEPHONE (OUTPATIENT)
Dept: NEUROLOGY | Age: 77
End: 2022-10-07

## 2022-10-10 NOTE — TELEPHONE ENCOUNTER
Returned call and patient's  stated they have found somewhere else to be seen this month. Also, asked that the appt for Aug 2023 with Dr Malgorzata Ford be cancelled.

## 2022-11-20 NOTE — PROGRESS NOTES
HISTORY OF PRESENT ILLNESS  Vania oRjo is a 69 y.o. female. HPI  Here for follow up of Alzheimer's dz. Seen end of September and started on Aricept 5mg x 1 month . Did not tolerate, caused hallucinations. Was unable to increase dose and stopped. Saw Dr. Sandi Lewis who recommended stopping the Donepezil. Referred to Dr. Jose Armando Collins for neuropsych testing, had 4 hour assessment  -reviewed results in Epic. Consistent with dementia but did not increased speech issues. Pt has f/up with Dr. Sandi Lewis on 12/9 to discuss results of testing. Continued stomach issues. Eating FODMAP diet. Explosive diarrhea awhile in [place] - lasted 1 hour continuously but then resolved. Has not had any further episodes. Knows she needs to chew beef very well or cut into very small pieces. Taking Dicyclomine regularly once a day in am.  Has been seeing Dr. Bri Chavez. Current Outpatient Medications   Medication Instructions    acetaminophen (TYLENOL) 1,000 mg, Oral, 2 TIMES DAILY    calcium-cholecalciferol, d3, 600-125 mg-unit tab 1 Tablet, Oral, DAILY    cholecalciferol (VITAMIN D3) 25 mcg (1,000 unit) cap 1 Capsule, Oral, DAILY    dicyclomine (BENTYL) 10 mg capsule TAKE ONE CAPSULE BY MOUTH 3 TIMES A DAY AS NEEDED ABDOMINAL PAIN OR DIARRHEA    loratadine (CLARITIN) 10 mg, Oral    multivitamin (ONE A DAY) tablet 1 Tablet, DAILY    omega-3 fatty acids-vitamin e 1,000 mg cap 1 Capsule, Oral       Visit Vitals  /74 (BP 1 Location: Left upper arm, BP Patient Position: Sitting, BP Cuff Size: Adult)   Pulse 65   Temp 98.3 °F (36.8 °C)   Wt 123 lb (55.8 kg)   SpO2 98%   BMI 21.11 kg/m²     . ROS  See above  Physical Exam  Vitals reviewed. Constitutional:       Appearance: Normal appearance. HENT:      Head: Normocephalic and atraumatic. Neck:      Vascular: No carotid bruit. Cardiovascular:      Rate and Rhythm: Normal rate and regular rhythm. Pulses: Normal pulses. Heart sounds: Normal heart sounds. Pulmonary:      Effort: Pulmonary effort is normal.      Breath sounds: Normal breath sounds. Abdominal:      General: Bowel sounds are normal. There is no distension. Palpations: Abdomen is soft. There is no mass. Tenderness: There is no abdominal tenderness. Musculoskeletal:      Cervical back: Neck supple. Lymphadenopathy:      Cervical: No cervical adenopathy. Neurological:      Mental Status: She is alert. ASSESSMENT and PLAN  Diagnoses and all orders for this visit:    1. Alzheimer's disease, unspecified - neuropsych testing consistent with dementia. Did not tolerate Aricept. F/up with Dr. Yesi Lieberman in 2 weeks. 2. Irritable bowel syndrome with diarrhea - episode in [place] but none since. Continue dycyclomine prn    Follow-up and Dispositions    Return in about 6 months (around 5/21/2023).

## 2022-11-21 ENCOUNTER — OFFICE VISIT (OUTPATIENT)
Dept: INTERNAL MEDICINE CLINIC | Age: 77
End: 2022-11-21
Payer: MEDICARE

## 2022-11-21 VITALS
BODY MASS INDEX: 21.11 KG/M2 | TEMPERATURE: 98.3 F | DIASTOLIC BLOOD PRESSURE: 74 MMHG | HEART RATE: 65 BPM | WEIGHT: 123 LBS | OXYGEN SATURATION: 98 % | SYSTOLIC BLOOD PRESSURE: 118 MMHG

## 2022-11-21 DIAGNOSIS — G30.9 ALZHEIMER'S DISEASE, UNSPECIFIED (CODE) (HCC): Primary | ICD-10-CM

## 2022-11-21 DIAGNOSIS — K58.0 IRRITABLE BOWEL SYNDROME WITH DIARRHEA: ICD-10-CM

## 2022-11-21 PROCEDURE — G8420 CALC BMI NORM PARAMETERS: HCPCS | Performed by: INTERNAL MEDICINE

## 2022-11-21 PROCEDURE — 99214 OFFICE O/P EST MOD 30 MIN: CPT | Performed by: INTERNAL MEDICINE

## 2022-11-21 PROCEDURE — G8432 DEP SCR NOT DOC, RNG: HCPCS | Performed by: INTERNAL MEDICINE

## 2022-11-21 PROCEDURE — G0463 HOSPITAL OUTPT CLINIC VISIT: HCPCS | Performed by: INTERNAL MEDICINE

## 2022-11-21 PROCEDURE — G8536 NO DOC ELDER MAL SCRN: HCPCS | Performed by: INTERNAL MEDICINE

## 2022-11-21 PROCEDURE — 1101F PT FALLS ASSESS-DOCD LE1/YR: CPT | Performed by: INTERNAL MEDICINE

## 2022-11-21 PROCEDURE — G8399 PT W/DXA RESULTS DOCUMENT: HCPCS | Performed by: INTERNAL MEDICINE

## 2022-11-21 PROCEDURE — G8427 DOCREV CUR MEDS BY ELIG CLIN: HCPCS | Performed by: INTERNAL MEDICINE

## 2022-11-21 PROCEDURE — 1090F PRES/ABSN URINE INCON ASSESS: CPT | Performed by: INTERNAL MEDICINE

## 2022-11-21 NOTE — PROGRESS NOTES
Chief Complaint   Patient presents with    Follow-up     Pt states she wants  to discuss GI problems          Vitals:    11/21/22 1131   BP: 118/74   Pulse: 65   Temp: 98.3 °F (36.8 °C)   SpO2: 98%   Weight: 123 lb (55.8 kg)   PainSc:   0 - No pain       Current Outpatient Medications on File Prior to Visit   Medication Sig Dispense Refill    loratadine (CLARITIN) 10 mg tablet Take 10 mg by mouth. acetaminophen (TYLENOL) 500 mg tablet Take 1,000 mg by mouth two (2) times a day. dicyclomine (BENTYL) 10 mg capsule TAKE ONE CAPSULE BY MOUTH 3 TIMES A DAY AS NEEDED ABDOMINAL PAIN OR DIARRHEA 30 Cap 3    calcium-cholecalciferol, d3, 600-125 mg-unit tab Take 1 Tablet by mouth daily. omega-3 fatty acids-vitamin e 1,000 mg cap Take 1 Cap by mouth.      multivitamin (ONE A DAY) tablet Take 1 Tab by mouth daily. donepeziL (ARICEPT) 5 mg tablet Take 1 Tablet by mouth nightly. For 1st month. (Patient not taking: Reported on 11/21/2022) 30 Tablet 0    donepeziL (ARICEPT) 10 mg tablet Take 1 Tablet by mouth nightly. Start month 2 and continue (Patient not taking: Reported on 11/21/2022) 30 Tablet 0    OTHER Calcium injections (Patient not taking: Reported on 11/21/2022)      cholecalciferol (VITAMIN D3) 25 mcg (1,000 unit) cap Take 1 Cap by mouth daily. No current facility-administered medications on file prior to visit. Health Maintenance Due   Topic    COVID-19 Vaccine (2 - Moderna series)    Flu Vaccine (1)    DTaP/Tdap/Td series (2 - Td or Tdap)       1. \"Have you been to the ER, urgent care clinic since your last visit? Hospitalized since your last visit? \" No    2. \"Have you seen or consulted any other health care providers outside of the [address] [place] [address] since your last visit? \" No     3. For patients aged 39-70: Has the patient had a colonoscopy / FIT/ Cologuard? No      If the patient is female:    4.  For patients aged 41-77: Has the patient had a mammogram within the past 2 years? No      5. For patients aged 21-65: Has the patient had a pap smear?  NA - based on age or sex

## 2023-01-13 ENCOUNTER — TRANSCRIBE ORDER (OUTPATIENT)
Dept: SCHEDULING | Age: 78
End: 2023-01-13

## 2023-01-13 DIAGNOSIS — Z12.31 SCREENING MAMMOGRAM FOR HIGH-RISK PATIENT: Primary | ICD-10-CM

## 2023-02-03 ENCOUNTER — TELEPHONE (OUTPATIENT)
Dept: INTERNAL MEDICINE CLINIC | Age: 78
End: 2023-02-03

## 2023-02-03 NOTE — TELEPHONE ENCOUNTER
Patient's spouse called in wanting to make sure Dr. Krissy Mayer aware of ongoing GI issues with patient. He sent message yesterday: \"This is [employer] writing for Vania. She met today (2/2/23) with Dr. Rosmery Arzate nurse and is no further along with solving her GI issues. She is still losing weight, losing her taste/enjoyment of foods, still has IBS, and generally has a limited range of foods that do not trigger a rapid gut response. He has scheduled an EGD for July 17, 2023. How do we get a handle on her condition more quickly? Thank you\"    He wants advice, a callback to his cell phone number or hers, whenever possible. They are also happy to make a VV to discuss these issues further.

## 2023-02-03 NOTE — TELEPHONE ENCOUNTER
Met with NP at GI. Ordered EGD and labs through [facility]. Foods still causing a lot of stomach discomfort and diarrhea. Taking Dicyclomine once daily. Down to approx 120lbs. Went back to see the neurologist in December. Started her on Exelon patches. Has 2 more weeks on the 4.7mg dose and then will increase to the 9.3mg dose. Mentioned wanting to having her increase to full dose Exelon prior to starting antianxiety medication. Has follow up with neurologist in June. Very anxious. No concept of time.      Will have her follow up in 1 month, if tolerating the higher dose of Exelon  will start low dose Citalopram.

## 2023-03-09 NOTE — PROGRESS NOTES
Vania Cooper, who was evaluated through a synchronous (real-time) audio-video encounter, and/or her healthcare decision maker, is aware that it is a billable service, which includes applicable co-pays, with coverage as determined by her insurance carrier. She provided verbal consent to proceed and patient identification was verified. This visit was conducted pursuant to the emergency declaration under the [facility] [address] and the [employer] General Act. A caregiver was present when appropriate. Ability to conduct physical exam was limited. The patient was located at: Home: [address] [facility] [id number]  The provider was located at: Home: Jus Lazo MD on 3/10/2023 at 5:33 PM      HISTORY OF PRESENT ILLNESS  Leonela Morelos is a 69 y.o. female.  is present. HPI  Here for follow up of her Alzheimer's dementia and IBS. Tried Exelon patch but had topical allergic reaction. Caused huge red circles with itching. Changed to Galantamine SR 8mg - first dose today - through her neurologist. Has follow up with neurologist July 21. Continued stomach issues. Daily episodes of multiple large stools of 15 minutes - dark colored. No cramping. Has restricted her diet. Mostly in the am.  Not taking the bentyl regularly. Beef, garlic, onions makes things worse. Continued anxiety. IBS triggers anxiety. Walking every day, will play golf when warms up.       Current Outpatient Medications   Medication Instructions    acetaminophen (TYLENOL) 1,000 mg, Oral, 2 TIMES DAILY    calcium-cholecalciferol, d3, 600-125 mg-unit tab 1 Tablet, Oral, DAILY    cholecalciferol (VITAMIN D3) 25 mcg (1,000 unit) cap 1 Capsule, Oral, DAILY    dicyclomine (BENTYL) 10 mg capsule TAKE ONE CAPSULE BY MOUTH 3 TIMES A DAY AS NEEDED ABDOMINAL PAIN OR DIARRHEA    diphenhydrAMINE (BENADRYL ALLERGY) 25 mg, Oral, 2 TIMES DAILY    galantamine (RAZADYNE) 8 mg, Oral, DAILY    loratadine (CLARITIN) 10 mg, Oral    multivitamin (ONE A DAY) tablet 1 Tablet, DAILY    omega-3 fatty acids-vitamin e 1,000 mg cap 1 Capsule, Oral     No flowsheet data found. ROS  See above  Physical Exam  Vitals reviewed. Constitutional:       Appearance: Normal appearance. Neck:      Comments: Nml appearance  Pulmonary:      Effort: Pulmonary effort is normal.      Comments: Nml rate  Neurological:      Mental Status: She is alert. ASSESSMENT and PLAN  Diagnoses and all orders for this visit:    1. Alzheimer's disease, unspecified - just started Galantamine. Marian Gil to answer questions re new meds, reactions to previous meds and bowel issues without problems. Continue same. Follow up with neurology. 2. Irritable bowel syndrome with diarrhea - continues. ? Tied to anxiety. Continue dietary changes. ? Citalopram in future    3. Anxiety - pt would benefit from trial of citalopram but want her to be on Galantamine approx 2 months prior to starting new med. Followup 2-3 months. Follow up 2-3 months.

## 2023-03-10 ENCOUNTER — VIRTUAL VISIT (OUTPATIENT)
Dept: INTERNAL MEDICINE CLINIC | Age: 78
End: 2023-03-10

## 2023-03-10 DIAGNOSIS — K58.0 IRRITABLE BOWEL SYNDROME WITH DIARRHEA: ICD-10-CM

## 2023-03-10 DIAGNOSIS — F41.9 ANXIETY: ICD-10-CM

## 2023-03-10 DIAGNOSIS — G30.9 ALZHEIMER'S DISEASE, UNSPECIFIED (CODE) (HCC): Primary | ICD-10-CM

## 2023-03-10 RX ORDER — DIPHENHYDRAMINE HCL 25 MG
25 TABLET ORAL 2 TIMES DAILY
COMMUNITY

## 2023-03-10 RX ORDER — GALANTAMINE HYDROBROMIDE 8 MG/1
8 CAPSULE, EXTENDED RELEASE ORAL DAILY
COMMUNITY
Start: 2023-03-06 | End: 2023-04-05

## 2023-03-10 NOTE — PROGRESS NOTES
Patient here to discuss GI issues, medication evaluation. No chief complaint on file. There were no vitals filed for this visit. Current Outpatient Medications on File Prior to Visit   Medication Sig Dispense Refill    galantamine (RAZADYNE) 8 mg SR capsule Take 8 mg by mouth daily. diphenhydrAMINE (Benadryl Allergy) 25 mg tablet Take 25 mg by mouth two (2) times a day. loratadine (CLARITIN) 10 mg tablet Take 10 mg by mouth. acetaminophen (TYLENOL) 500 mg tablet Take 1,000 mg by mouth two (2) times a day. dicyclomine (BENTYL) 10 mg capsule TAKE ONE CAPSULE BY MOUTH 3 TIMES A DAY AS NEEDED ABDOMINAL PAIN OR DIARRHEA 30 Cap 3    calcium-cholecalciferol, d3, 600-125 mg-unit tab Take 1 Tablet by mouth daily. cholecalciferol (VITAMIN D3) 25 mcg (1,000 unit) cap Take 1 Cap by mouth daily. omega-3 fatty acids-vitamin e 1,000 mg cap Take 1 Cap by mouth.      multivitamin (ONE A DAY) tablet Take 1 Tab by mouth daily. No current facility-administered medications on file prior to visit. Health Maintenance Due   Topic    COVID-19 Vaccine (2 - Moderna series)    Flu Vaccine (1)    DTaP/Tdap/Td series (2 - Td or Tdap)    Medicare Yearly Exam        1. \"Have you been to the ER, urgent care clinic since your last visit? Hospitalized since your last visit? \" No    2. \"Have you seen or consulted any other health care providers outside of the [address] [place] [address] since your last visit? \" Yes [facility], Neurology      3. For patients aged 39-70: Has the patient had a colonoscopy / FIT/ Cologuard? No      If the patient is female:    4. For patients aged 41-77: Has the patient had a mammogram within the past 2 years? No      5. For patients aged 21-65: Has the patient had a pap smear?  NA - based on age or sex

## 2023-03-21 ENCOUNTER — HOSPITAL ENCOUNTER (OUTPATIENT)
Dept: MAMMOGRAPHY | Age: 78
Discharge: HOME | End: 2023-03-21
Attending: INTERNAL MEDICINE
Payer: MEDICARE

## 2023-03-21 DIAGNOSIS — Z12.31 SCREENING MAMMOGRAM FOR HIGH-RISK PATIENT: ICD-10-CM

## 2023-03-21 PROCEDURE — 77063 BREAST TOMOSYNTHESIS BI: CPT

## 2023-03-30 ENCOUNTER — VIRTUAL VISIT (OUTPATIENT)
Dept: INTERNAL MEDICINE CLINIC | Age: 78
End: 2023-03-30

## 2023-03-30 DIAGNOSIS — K58.0 IRRITABLE BOWEL SYNDROME WITH DIARRHEA: ICD-10-CM

## 2023-03-30 DIAGNOSIS — Z00.00 MEDICARE ANNUAL WELLNESS VISIT, SUBSEQUENT: Primary | ICD-10-CM

## 2023-03-30 DIAGNOSIS — Z11.1 SCREENING FOR TUBERCULOSIS: ICD-10-CM

## 2023-03-30 DIAGNOSIS — G30.9 ALZHEIMER'S DISEASE, UNSPECIFIED (CODE) (HCC): ICD-10-CM

## 2023-03-30 RX ORDER — GALANTAMINE HYDROBROMIDE 16 MG/1
CAPSULE, EXTENDED RELEASE ORAL
COMMUNITY
Start: 2023-03-23

## 2023-03-30 NOTE — PROGRESS NOTES
Vania Gorman, who was evaluated through a synchronous (real-time) audio-video encounter, and/or her healthcare decision maker, is aware that it is a billable service, which includes applicable co-pays, with coverage as determined by her insurance carrier. She provided verbal consent to proceed and patient identification was verified. This visit was conducted pursuant to the emergency declaration under the [facility], [address] [place] [address] and the [employer] General Act. A caregiver was present when appropriate. Ability to conduct physical exam was limited. The patient was located at: Home: [address] [facility] [id number]  The provider was located at: Home: Cade Monzon MD on 3/30/2023 at 11:08 AM             This is the Subsequent Medicare Annual Wellness Exam, performed 12 months or more after the Initial AWV or the last Subsequent AWV    I have reviewed the patient's medical history in detail and updated the computerized patient record. Needs paperwork completed for Assisted LIving. She and her  are moving into [facility]. Mild dementia. Tolerating Galantamine SR 8mg caps. Will increase to 16mg next week. Followed by [facility]  Some low back pain but not interfering with gold. Still quite active. Diarrhea continues intermittently with IBS. Had follow up with PA at Dr. Joycelyn Moreno office. Would like 2nd opinion. Assessment/Plan   Education and counseling provided:  Are appropriate based on today's review and evaluation    1. Medicare annual wellness visit, subsequent  2. Screening for tuberculosis  -     QUANTIFERON-TB GOLD PLUS  3. Alzheimer's disease, unspecified  4. Irritable bowel syndrome with diarrhea - recommended [place]  5. Forms completed for assisted living  6. Intermittent LBP - exercises provided.       Depression Risk Factor Screening     3 most recent PHQ Screens 3/30/2023   Little interest or pleasure in doing things Not at all   Feeling down, depressed, irritable, or hopeless Not at all   Total Score PHQ 2 0   Trouble falling or staying asleep, or sleeping too much Not at all   Feeling tired or having little energy Not at all   Poor appetite, weight loss, or overeating Not at all   Feeling bad about yourself - or that you are a failure or have let yourself or your family down Not at all   Trouble concentrating on things such as school, work, reading, or watching TV Not at all   Moving or speaking so slowly that other people could have noticed; or the opposite being so fidgety that others notice Not at all   Thoughts of being better off dead, or hurting yourself in some way Not at all   PHQ 9 Score 0   How difficult have these problems made it for you to do your work, take care of your home and get along with others Not difficult at all       Alcohol & Drug Abuse Risk Screen   Do you average more than 1 drink per night or more than 7 drinks a week?: (P) No  On any one occasion in the past three months have you had more than 3 drinks containing alcohol?: (P) No          Functional Ability and Level of Safety   Hearing:  Hearing: (P) Patient reports hearing is good    Activities of Daily Living: The home contains: (P) no safety equipment, rugs  Functional ADLs: (P) Patient does total self care   Ambulation:  Patient ambulates: (P) with no difficulty   Fall Risk:  Fall Risk Assessment, last 12 mths 3/30/2023   Able to walk? Yes   Fall in past 12 months? 1   Do you feel unsteady? 0   Are you worried about falling 0   Number of falls in past 12 months 1   Fall with injury?  0     Abuse Screen:  Do you ever feel afraid of your partner?: (P) No  Are you in a relationship with someone who physically or mentally threatens you?: (P) No  Is it safe for you to go home?: (P) Yes      Patient-Reported Vitals 3/30/2023   Patient-Reported Pulse 62   Patient-Reported Systolic  655 Patient-Reported Diastolic 59               Cognitive Screening   Has your family/caregiver stated any concerns about your memory?: (P) Yes  Cognitive Screening: Abnormal - MMSE (Mini Mental Status Exam) 9/22 20/30.   [facility]     Health Maintenance Due   Topic Date Due    COVID-19 Vaccine (2 - Moderna series) Up to date    Flu Vaccine (1) Up to date    DTaP/Tdap/Td series (2 - Td or Tdap) 11/15/2022 - feels up to date    Medicare Yearly Exam  03/30/2024       Patient Care Team   Patient Care Team:  Odette Caldwell MD as PCP - General (Internal Medicine Physician)  Odette Caldwell MD as PCP - [facility] Empaneled Provider  Luz Elena Olmstead MD (Obstetrics & Gynecology)  Malachi Dugan MD (Ophthalmology)  Solitario Delacruz MD (Dermatology Physician)  Renetta Petty MD (Gastroenterology)  Brie Colon MD (Orthopedic Surgery)    History     Patient Active Problem List   Diagnosis Code    Allergic dermatitis due to poison ivy L23.7    Vertigo, benign paroxysmal H81.10    Migraine G43.909    Hx of basal cell carcinoma Z85.828    History of colon polyps Z86.010    Esophageal spasm K22.4    Advance directive discussed with patient Z71.89    Chronic bilateral low back pain with right-sided sciatica M54.41, G89.29    PUD (peptic ulcer disease) K27.9    Irritable bowel syndrome with diarrhea K58.0    Primary osteoarthritis of both knees M17.0    Alzheimer's disease, unspecified G30.9     Past Medical History:   Diagnosis Date    Colon polyps 1996    Esophageal spasm     Menopause     LMP-Unknown    Migraine     Osteopenia     Vertigo       Past Surgical History:   Procedure Laterality Date    COLONOSCOPY N/A 7/2/2019    COLONOSCOPY performed by Renetta Petty MD at [facility] ENDOSCOPY    COLONOSCOPY N/A 8/15/2022    COLONOSCOPY performed by Renetta Petty MD at [facility] ENDOSCOPY    ENDOSCOPY, COLON, DIAGNOSTIC  2012    negative; 4 th one    HX ORTHOPAEDIC Right 2001    r knee ACL, removal of loose body    HX ORTHOPAEDIC Right 1995    R wrist surgery after carpal fractures     HX ORTHOPAEDIC Right 9-2014    rotator cuff repair/ reattached biceps with screw     Current Outpatient Medications   Medication Sig Dispense Refill    galantamine (RAZADYNE) 8 mg SR capsule Take 8 mg by mouth daily. loratadine (CLARITIN) 10 mg tablet Take 10 mg by mouth daily as needed. acetaminophen (TYLENOL) 500 mg tablet Take 1,000 mg by mouth two (2) times a day. dicyclomine (BENTYL) 10 mg capsule TAKE ONE CAPSULE BY MOUTH 3 TIMES A DAY AS NEEDED ABDOMINAL PAIN OR DIARRHEA (Patient taking differently: Take 10 mg by mouth daily. TAKE ONE CAPSULE BY MOUTH 3 TIMES A DAY AS NEEDED ABDOMINAL PAIN OR DIARRHEA) 30 Cap 3    calcium-cholecalciferol, d3, 600-125 mg-unit tab Take 1 Tablet by mouth daily. cholecalciferol (VITAMIN D3) 25 mcg (1,000 unit) cap Take 1 Cap by mouth daily. omega-3 fatty acids-vitamin e 1,000 mg cap Take 1 Capsule by mouth daily. multivitamin (ONE A DAY) tablet Take 1 Tab by mouth daily. galantamine (RAZADYNE) 16 mg SR capsule TAKE 1 CAPSULE BY MOUTH DAILY WITH BREAKFAST.  (Patient not taking: Reported on 3/30/2023)       Allergies   Allergen Reactions    Darvocet A500 [Propoxyphene N-Acetaminophen] Other (comments)     headache    Flexeril [Cyclobenzaprine] Vertigo    Pcn [Penicillins] Hives    Percocet [Oxycodone-Acetaminophen] Other (comments)     Headache - severe    Poison Ivy [Vit E-Nonoxynol 9-Aloe Vera] Rash     Severe allergy       Family History   Problem Relation Age of Onset    Hypertension Mother     Hypertension Father     Mult Sclerosis Daughter     Other Brother         celiac sprue    Broken Bones Brother         Ankle--football injury    Breast Cancer Paternal Grandmother         Age Late 60's/Early 66's     Social History     Tobacco Use    Smoking status: Never    Smokeless tobacco: Never   Substance Use Topics Alcohol use:  Yes     Alcohol/week: 8.0 standard drinks     Types: 1 Glasses of wine, 7 Standard drinks or equivalent per week     Comment: 1 at dinner         Diamond Crowe MD

## 2023-03-30 NOTE — PROGRESS NOTES
Patient needs forms filled. No chief complaint on file. There were no vitals filed for this visit. Current Outpatient Medications on File Prior to Visit   Medication Sig Dispense Refill    galantamine (RAZADYNE) 8 mg SR capsule Take 8 mg by mouth daily. loratadine (CLARITIN) 10 mg tablet Take 10 mg by mouth daily as needed. acetaminophen (TYLENOL) 500 mg tablet Take 1,000 mg by mouth two (2) times a day. dicyclomine (BENTYL) 10 mg capsule TAKE ONE CAPSULE BY MOUTH 3 TIMES A DAY AS NEEDED ABDOMINAL PAIN OR DIARRHEA (Patient taking differently: Take 10 mg by mouth daily. TAKE ONE CAPSULE BY MOUTH 3 TIMES A DAY AS NEEDED ABDOMINAL PAIN OR DIARRHEA) 30 Cap 3    calcium-cholecalciferol, d3, 600-125 mg-unit tab Take 1 Tablet by mouth daily. cholecalciferol (VITAMIN D3) 25 mcg (1,000 unit) cap Take 1 Cap by mouth daily. omega-3 fatty acids-vitamin e 1,000 mg cap Take 1 Capsule by mouth daily. multivitamin (ONE A DAY) tablet Take 1 Tab by mouth daily. diphenhydrAMINE (BENADRYL) 25 mg tablet Take 25 mg by mouth two (2) times a day. (Patient not taking: Reported on 3/30/2023)       No current facility-administered medications on file prior to visit. Health Maintenance Due   Topic    COVID-19 Vaccine (2 - Moderna series)    Flu Vaccine (1)    DTaP/Tdap/Td series (2 - Td or Tdap)    Medicare Yearly Exam        1. \"Have you been to the ER, urgent care clinic since your last visit? Hospitalized since your last visit? \" Yes Patient 1st, patches     2. \"Have you seen or consulted any other health care providers outside of the [address] [place] [address] since your last visit? \" Yes Neurology, [facility]      3. For patients aged 39-70: Has the patient had a colonoscopy / FIT/ Cologuard? No      If the patient is female:    4. For patients aged 41-77: Has the patient had a mammogram within the past 2 years? Yes - no [facility] present [facility]      5.  For patients aged 21-65: Has the patient had a pap smear?  NA - based on age or sex

## 2023-03-30 NOTE — PATIENT INSTRUCTIONS
Low Back Pain: Exercises  Introduction  Here are some examples of exercises for you to try. The exercises may be suggested for a condition or for rehabilitation. Start each exercise slowly. Ease off the exercises if you start to have pain. You will be told when to start these exercises and which ones will work best for you. How to do the exercises  Press-up  Lie on your stomach, supporting your body with your forearms. Press your elbows down into the floor to raise your upper back. As you do this, relax your stomach muscles and allow your back to arch without using your back muscles. As your press up, do not let your hips or pelvis come off the floor. Hold for 15 to 30 seconds, then relax. Repeat 2 to 4 times. Alternate arm and leg (bird dog) exercise  Do this exercise slowly. Try to keep your body straight at all times, and do not let one hip drop lower than the other. Start on the floor, on your hands and knees. Tighten your belly muscles. Raise one leg off the floor, and hold it straight out behind you. Be careful not to let your hip drop down, because that will twist your trunk. Hold for about 6 seconds, then lower your leg and switch to the other leg. Repeat 8 to 12 times on each leg. Over time, work up to holding for 10 to 30 seconds each time. If you feel stable and secure with your leg raised, try raising the opposite arm straight out in front of you at the same time. Knee-to-chest exercise  Lie on your back with your knees bent and your feet flat on the floor. Bring one knee to your chest, keeping the other foot flat on the floor (or keeping the other leg straight, whichever feels better on your lower back). Keep your lower back pressed to the floor. Hold for at least 15 to 30 seconds. Relax, and lower the knee to the starting position. Repeat with the other leg. Repeat 2 to 4 times with each leg.   To get more stretch, put your other leg flat on the floor while pulling your knee to your chest.  Curl-ups  Lie on the floor on your back with your knees bent at a 90-degree angle. Your feet should be flat on the floor, about 12 inches from your buttocks. Cross your arms over your chest. If this bothers your neck, try putting your hands behind your neck (not your head), with your elbows spread apart. Slowly tighten your belly muscles and raise your shoulder blades off the floor. Keep your head in line with your body, and do not press your chin to your chest.  Hold this position for 1 or 2 seconds, then slowly lower yourself back down to the floor. Repeat 8 to 12 times. Pelvic tilt exercise  Lie on your back with your knees bent. \"Brace\" your stomach. This means to tighten your muscles by pulling in and imagining your belly button moving toward your spine. You should feel like your back is pressing to the floor and your hips and pelvis are rocking back. Hold for about 6 seconds while you breathe smoothly. Repeat 8 to 12 times. Heel dig bridging  Lie on your back with both knees bent and your ankles bent so that only your heels are digging into the floor. Your knees should be bent about 90 degrees. Then push your heels into the floor, squeeze your buttocks, and lift your hips off the floor until your shoulders, hips, and knees are all in a straight line. Hold for about 6 seconds as you continue to breathe normally, and then slowly lower your hips back down to the floor and rest for up to 10 seconds. Do 8 to 12 repetitions. Hamstring stretch in doorway  Lie on your back in a doorway, with one leg through the open door. Slide your leg up the wall to straighten your knee. You should feel a gentle stretch down the back of your leg. Hold the stretch for at least 15 to 30 seconds. Do not arch your back, point your toes, or bend either knee. Keep one heel touching the floor and the other heel touching the wall. Repeat with your other leg. Do 2 to 4 times for each leg.   Hip flexor stretch  Kneel on the floor with one knee bent and one leg behind you. Place your forward knee over your foot. Keep your other knee touching the floor. Slowly push your hips forward until you feel a stretch in the upper thigh of your rear leg. Hold the stretch for at least 15 to 30 seconds. Repeat with your other leg. Do 2 to 4 times on each side. Wall sit  Stand with your back 10 to 12 inches away from a wall. Lean into the wall until your back is flat against it. Slowly slide down until your knees are slightly bent, pressing your lower back into the wall. Hold for about 6 seconds, then slide back up the wall. Repeat 8 to 12 times. Follow-up care is a key part of your treatment and safety. Be sure to make and go to all appointments, and call your doctor if you are having problems. It's also a good idea to know your test results and keep a list of the medicines you take. Current as of: March 9, 2022               Content Version: 13.4  © 2006-2022 [employer]. Care instructions adapted under license by [facility] (which disclaims liability or warranty for this information). If you have questions about a medical condition or this instruction, always ask your healthcare professional. [place] [address] any warranty or liability for your use of this information. Medicare Wellness Visit, Female     The best way to live healthy is to have a lifestyle where you eat a well-balanced diet, exercise regularly, limit alcohol use, and quit all forms of tobacco/nicotine, if applicable. Regular preventive services are another way to keep healthy. Preventive services (vaccines, screening tests, monitoring & exams) can help personalize your care plan, which helps you manage your own care. Screening tests can find health problems at the earliest stages, when they are easiest to treat.    [facility] follows the current, evidence-based guidelines published by the [facility] (USPSTF) when recommending preventive services for our patients. Because we follow these guidelines, sometimes recommendations change over time as research supports it. (For example, mammograms used to be recommended annually. Even though Medicare will still pay for an annual mammogram, the newer guidelines recommend a mammogram every two years for women of average risk). Of course, you and your doctor may decide to screen more often for some diseases, based on your risk and your co-morbidities (chronic disease you are already diagnosed with). Preventive services for you include:  - Medicare offers their members a free annual wellness visit, which is time for you and your primary care provider to discuss and plan for your preventive service needs. Take advantage of this benefit every year!    -Over the age of 72 should receive the recommended pneumonia vaccines.    -All adults should have a flu vaccine yearly.  -All adults should have a tetanus vaccine every 10 years.   -Over the age 48 should receive the shingles vaccines.        -All adults should be screened once for Hepatitis C.  -All adults age 38-68 who are overweight should have a diabetes screening test once every three years.   -Other screening tests and preventive services for persons with diabetes include: an eye exam to screen for diabetic retinopathy, a kidney function test, a foot exam, and stricter control over your cholesterol.   -Cardiovascular screening for adults with routine risk involves an electrocardiogram (ECG) at intervals determined by your doctor.     -Colorectal cancer screenings should be done for adults age 39-70 with no increased risk factors for colorectal cancer. There are a number of acceptable methods of screening for this type of cancer. Each test has its own benefits and drawbacks.  Discuss with your doctor what is most appropriate for you during your annual wellness visit. The different tests include: colonoscopy (considered the best screening method), a fecal occult blood test, a fecal DNA test, and sigmoidoscopy.    -Lung cancer screening is recommended annually with a low dose CT scan for adults between age 54 and 68, who have smoked at least 30 pack years (equivalent of 1 pack per day for 30 days), and who is a current smoker or quit less than 15 years ago.    -A bone mass density test is recommended when a woman turns 65 to screen for osteoporosis. This test is only recommended one time, as a screening. Some providers will use this same test as a disease monitoring tool if you already have osteoporosis. -Breast cancer screenings are recommended every other year for women of normal risk, age 54-69.    -Cervical cancer screenings for women over age 72 are only recommended with certain risk factors.      Here is a list of your current Health Maintenance items (your personalized list of preventive services) with a due date:      Health Maintenance Due   Topic Date Due    COVID-19 Vaccine (2 - Moderna series) Up to date    Flu Vaccine (1) Up to date    DTaP/Tdap/Td series (2 - Td or Tdap) 11/15/2022 - feels up to date    Medicare Yearly Exam  03/30/2024

## 2023-04-04 LAB
GAMMA INTERFERON BACKGROUND BLD IA-ACNC: 0.09 IU/ML
M TB IFN-G BLD-IMP: NEGATIVE
M TB IFN-G CD4+ T-CELLS BLD-ACNC: 0.06 IU/ML
M TBIFN-G CD4+ CD8+T-CELLS BLD-ACNC: 0.05 IU/ML
MITOGEN IGNF BLD-ACNC: >10 IU/ML
QUANTIFERON INCUBATION, QF1T: NORMAL
SERVICE CMNT-IMP: NORMAL

## 2023-05-19 RX ORDER — DICYCLOMINE HYDROCHLORIDE 10 MG/1
CAPSULE ORAL
COMMUNITY
Start: 2019-03-01

## 2023-05-19 RX ORDER — LORATADINE 10 MG/1
10 TABLET ORAL DAILY PRN
COMMUNITY

## 2023-05-19 RX ORDER — ACETAMINOPHEN 500 MG
1000 TABLET ORAL 2 TIMES DAILY
Status: ON HOLD | COMMUNITY
End: 2023-07-17 | Stop reason: HOSPADM

## 2023-05-19 RX ORDER — GALANTAMINE HYDROBROMIDE 16 MG/1
CAPSULE, EXTENDED RELEASE ORAL
COMMUNITY
Start: 2023-03-23

## 2023-05-22 ENCOUNTER — OFFICE VISIT (OUTPATIENT)
Age: 78
End: 2023-05-22
Payer: MEDICARE

## 2023-05-22 VITALS
RESPIRATION RATE: 20 BRPM | HEIGHT: 64 IN | BODY MASS INDEX: 21.75 KG/M2 | WEIGHT: 127.4 LBS | HEART RATE: 60 BPM | OXYGEN SATURATION: 100 % | TEMPERATURE: 98.3 F | SYSTOLIC BLOOD PRESSURE: 107 MMHG | DIASTOLIC BLOOD PRESSURE: 55 MMHG

## 2023-05-22 DIAGNOSIS — G30.9 ALZHEIMER'S DISEASE, UNSPECIFIED (CODE) (HCC): ICD-10-CM

## 2023-05-22 DIAGNOSIS — K58.0 IRRITABLE BOWEL SYNDROME WITH DIARRHEA: ICD-10-CM

## 2023-05-22 DIAGNOSIS — Z00.00 ROUTINE GENERAL MEDICAL EXAMINATION AT A HEALTH CARE FACILITY: Primary | ICD-10-CM

## 2023-05-22 PROCEDURE — 93005 ELECTROCARDIOGRAM TRACING: CPT | Performed by: INTERNAL MEDICINE

## 2023-05-22 PROCEDURE — 93010 ELECTROCARDIOGRAM REPORT: CPT | Performed by: INTERNAL MEDICINE

## 2023-05-22 SDOH — ECONOMIC STABILITY: HOUSING INSECURITY
IN THE LAST 12 MONTHS, WAS THERE A TIME WHEN YOU DID NOT HAVE A STEADY PLACE TO SLEEP OR SLEPT IN A SHELTER (INCLUDING NOW)?: NO

## 2023-05-22 SDOH — ECONOMIC STABILITY: FOOD INSECURITY: WITHIN THE PAST 12 MONTHS, YOU WORRIED THAT YOUR FOOD WOULD RUN OUT BEFORE YOU GOT MONEY TO BUY MORE.: NEVER TRUE

## 2023-05-22 SDOH — ECONOMIC STABILITY: FOOD INSECURITY: WITHIN THE PAST 12 MONTHS, THE FOOD YOU BOUGHT JUST DIDN'T LAST AND YOU DIDN'T HAVE MONEY TO GET MORE.: NEVER TRUE

## 2023-05-22 SDOH — ECONOMIC STABILITY: INCOME INSECURITY: HOW HARD IS IT FOR YOU TO PAY FOR THE VERY BASICS LIKE FOOD, HOUSING, MEDICAL CARE, AND HEATING?: NOT HARD AT ALL

## 2023-05-22 ASSESSMENT — ANXIETY QUESTIONNAIRES
5. BEING SO RESTLESS THAT IT IS HARD TO SIT STILL: 0
6. BECOMING EASILY ANNOYED OR IRRITABLE: 0
IF YOU CHECKED OFF ANY PROBLEMS ON THIS QUESTIONNAIRE, HOW DIFFICULT HAVE THESE PROBLEMS MADE IT FOR YOU TO DO YOUR WORK, TAKE CARE OF THINGS AT HOME, OR GET ALONG WITH OTHER PEOPLE: NOT DIFFICULT AT ALL
1. FEELING NERVOUS, ANXIOUS, OR ON EDGE: 3
4. TROUBLE RELAXING: 0
7. FEELING AFRAID AS IF SOMETHING AWFUL MIGHT HAPPEN: 0
2. NOT BEING ABLE TO STOP OR CONTROL WORRYING: 2
GAD7 TOTAL SCORE: 6
3. WORRYING TOO MUCH ABOUT DIFFERENT THINGS: 1

## 2023-05-22 ASSESSMENT — PATIENT HEALTH QUESTIONNAIRE - PHQ9
SUM OF ALL RESPONSES TO PHQ9 QUESTIONS 1 & 2: 1
SUM OF ALL RESPONSES TO PHQ QUESTIONS 1-9: 1
1. LITTLE INTEREST OR PLEASURE IN DOING THINGS: 1
SUM OF ALL RESPONSES TO PHQ QUESTIONS 1-9: 1
2. FEELING DOWN, DEPRESSED OR HOPELESS: 0
SUM OF ALL RESPONSES TO PHQ QUESTIONS 1-9: 1
SUM OF ALL RESPONSES TO PHQ QUESTIONS 1-9: 1

## 2023-05-22 NOTE — PROGRESS NOTES
Subjective:   69 y.o. female for  CPE  Her gyne and breast care is done elsewhere by her Ob-Gyne physician. Patient Active Problem List    Diagnosis Date Noted    [facility], [facility] 09/30/2022    PUD (peptic ulcer disease) 09/04/2020    Primary osteoarthritis of both knees 09/04/2020    Irritable bowel syndrome with diarrhea 09/04/2020    Chronic bilateral low back pain with right-sided sciatica 08/19/2019    Advance directive discussed with patient 02/23/2017    Esophageal spasm     History of colon polyps 04/28/2015    Allergic dermatitis due to poison ivy 03/13/2013    Hx of basal cell carcinoma 03/13/2013    Migraine 03/13/2013    Vertigo, benign paroxysmal 03/13/2013     Current Outpatient Medications   Medication Sig Dispense Refill    Multiple Vitamins-Minerals (MULTIVITAMIN ADULTS PO) Take 1 tablet by mouth daily      acetaminophen (TYLENOL) 500 MG tablet Take 2 tablets by mouth 2 times daily      Calcium Carbonate-Vitamin D 600-3. 125 MG-MCG TABS Take 1 tablet by mouth daily      vitamin D 25 MCG (1000 UT) CAPS Take 1 capsule by mouth daily      dicyclomine (BENTYL) 10 MG capsule TAKE ONE CAPSULE BY MOUTH 3 TIMES A DAY AS NEEDED ABDOMINAL PAIN OR DIARRHEA      galantamine (RAZADYNE ER) 16 MG extended release capsule TAKE 1 CAPSULE BY MOUTH DAILY WITH BREAKFAST.      loratadine (CLARITIN) 10 MG tablet Take 1 tablet by mouth daily as needed      OMEGA-3 FATTY ACIDS PO Take 1 capsule by mouth daily       No current facility-administered medications for this visit. Allergies   Allergen Reactions    Cyclobenzaprine Dizziness or Vertigo    Fish-Derived Products Diarrhea    Garlic Diarrhea    Other Other (See Comments)     Reaction Type: Allergy;  Reaction(s): Poison Ivy    Oxycodone-Acetaminophen Other (See Comments)     Headache - severe    Penicillins Hives    Propoxyphene Other (See Comments)     headache    Shellfish Allergy Diarrhea    Tomato Diarrhea    Nonoxynol 9

## 2023-05-22 NOTE — PATIENT INSTRUCTIONS
For Ears: Debrox solution.   Follow instructions on package  For Varicose Veins - [facility] - Phone: [phone]

## 2023-05-22 NOTE — PROGRESS NOTES
Patient here for physical.    1. \"Have you been to the ER, urgent care clinic since your last visit? Hospitalized since your last visit? \" Yes UC, ear problem    2. \"Have you seen or consulted any other health care providers outside of the [address] [place] [address] since your last visit? \" No     3. For patients aged 39-70: Has the patient had a colonoscopy / FIT/ Cologuard? No      If the patient is female:    4. For patients aged 41-77: Has the patient had a mammogram within the past 2 years? Yes - no Care Gap present      5. For patients aged 21-65: Has the patient had a pap smear?  NA - based on age or sex

## 2023-07-17 ENCOUNTER — ANESTHESIA (OUTPATIENT)
Facility: HOSPITAL | Age: 78
End: 2023-07-17
Payer: MEDICARE

## 2023-07-17 ENCOUNTER — HOSPITAL ENCOUNTER (OUTPATIENT)
Facility: HOSPITAL | Age: 78
Setting detail: OUTPATIENT SURGERY
Discharge: HOME | End: 2023-07-17
Attending: INTERNAL MEDICINE | Admitting: INTERNAL MEDICINE
Payer: MEDICARE

## 2023-07-17 ENCOUNTER — ANESTHESIA EVENT (OUTPATIENT)
Facility: HOSPITAL | Age: 78
End: 2023-07-17
Payer: MEDICARE

## 2023-07-17 VITALS
TEMPERATURE: 98 F | SYSTOLIC BLOOD PRESSURE: 104 MMHG | RESPIRATION RATE: 15 BRPM | BODY MASS INDEX: 21.68 KG/M2 | WEIGHT: 127 LBS | DIASTOLIC BLOOD PRESSURE: 60 MMHG | HEART RATE: 48 BPM | HEIGHT: 64 IN | OXYGEN SATURATION: 99 %

## 2023-07-17 PROCEDURE — 7100000010 HC PHASE II RECOVERY - FIRST 15 MIN: Performed by: INTERNAL MEDICINE

## 2023-07-17 PROCEDURE — 3600007502: Performed by: INTERNAL MEDICINE

## 2023-07-17 PROCEDURE — 2580000003 HC RX 258: Performed by: NURSE ANESTHETIST, CERTIFIED REGISTERED

## 2023-07-17 PROCEDURE — 88305 TISSUE EXAM BY PATHOLOGIST: CPT

## 2023-07-17 PROCEDURE — 3700000000 HC ANESTHESIA ATTENDED CARE: Performed by: INTERNAL MEDICINE

## 2023-07-17 PROCEDURE — 6360000002 HC RX W HCPCS: Performed by: NURSE ANESTHETIST, CERTIFIED REGISTERED

## 2023-07-17 PROCEDURE — 2709999900 HC NON-CHARGEABLE SUPPLY: Performed by: INTERNAL MEDICINE

## 2023-07-17 PROCEDURE — 7100000011 HC PHASE II RECOVERY - ADDTL 15 MIN: Performed by: INTERNAL MEDICINE

## 2023-07-17 PROCEDURE — 2500000003 HC RX 250 WO HCPCS: Performed by: NURSE ANESTHETIST, CERTIFIED REGISTERED

## 2023-07-17 RX ORDER — SODIUM CHLORIDE 9 MG/ML
25 INJECTION, SOLUTION INTRAVENOUS PRN
Status: DISCONTINUED | OUTPATIENT
Start: 2023-07-17 | End: 2023-07-17 | Stop reason: HOSPADM

## 2023-07-17 RX ORDER — SODIUM CHLORIDE 9 MG/ML
INJECTION, SOLUTION INTRAVENOUS CONTINUOUS
Status: DISCONTINUED | OUTPATIENT
Start: 2023-07-17 | End: 2023-07-17 | Stop reason: HOSPADM

## 2023-07-17 RX ORDER — SODIUM CHLORIDE 9 MG/ML
INJECTION, SOLUTION INTRAVENOUS CONTINUOUS PRN
Status: DISCONTINUED | OUTPATIENT
Start: 2023-07-17 | End: 2023-07-17 | Stop reason: SDUPTHER

## 2023-07-17 RX ORDER — ACETAMINOPHEN 325 MG/1
650 TABLET ORAL EVERY 6 HOURS PRN
COMMUNITY

## 2023-07-17 RX ORDER — LIDOCAINE HYDROCHLORIDE 20 MG/ML
INJECTION, SOLUTION EPIDURAL; INFILTRATION; INTRACAUDAL; PERINEURAL PRN
Status: DISCONTINUED | OUTPATIENT
Start: 2023-07-17 | End: 2023-07-17 | Stop reason: SDUPTHER

## 2023-07-17 RX ORDER — SODIUM CHLORIDE 0.9 % (FLUSH) 0.9 %
5-40 SYRINGE (ML) INJECTION PRN
Status: DISCONTINUED | OUTPATIENT
Start: 2023-07-17 | End: 2023-07-17 | Stop reason: HOSPADM

## 2023-07-17 RX ORDER — PANTOPRAZOLE SODIUM 40 MG/1
40 TABLET, DELAYED RELEASE ORAL
Qty: 90 TABLET | Refills: 0 | Status: SHIPPED | OUTPATIENT
Start: 2023-07-17 | End: 2023-10-15

## 2023-07-17 RX ORDER — SODIUM CHLORIDE 0.9 % (FLUSH) 0.9 %
5-40 SYRINGE (ML) INJECTION EVERY 12 HOURS SCHEDULED
Status: DISCONTINUED | OUTPATIENT
Start: 2023-07-17 | End: 2023-07-17 | Stop reason: HOSPADM

## 2023-07-17 RX ORDER — PROPOFOL 10 MG/ML
INJECTION, EMULSION INTRAVENOUS PRN
Status: DISCONTINUED | OUTPATIENT
Start: 2023-07-17 | End: 2023-07-17 | Stop reason: SDUPTHER

## 2023-07-17 RX ADMIN — PROPOFOL 25 MG: 10 INJECTION, EMULSION INTRAVENOUS at 08:29

## 2023-07-17 RX ADMIN — SODIUM CHLORIDE: 9 INJECTION, SOLUTION INTRAVENOUS at 08:26

## 2023-07-17 RX ADMIN — PROPOFOL 75 MG: 10 INJECTION, EMULSION INTRAVENOUS at 08:26

## 2023-07-17 RX ADMIN — LIDOCAINE HYDROCHLORIDE 50 MG: 20 INJECTION, SOLUTION EPIDURAL; INFILTRATION; INTRACAUDAL; PERINEURAL at 08:26

## 2023-07-17 NOTE — PROGRESS NOTES

## 2023-07-17 NOTE — DISCHARGE INSTRUCTIONS
[facility]  [address], [address] [facility]    EGD DISCHARGE INSTRUCTIONS    Ed Postal  [id number]  1945    Discomfort:  Sore throat- throat lozenges or warm salt water gargle  redness at IV site- apply warm compress to area; if redness or soreness persist- contact your physician  Gaseous discomfort- walking, belching will help relieve any discomfort  You may not operate a vehicle for 12 hours  You may not engage in an occupation involving machinery or appliances for rest of today  You may not drink alcoholic beverages for at least 12 hours  Avoid making any critical decisions for at least 24 hour  DIET  You may resume your regular diet - however -  remember your colon is empty and a heavy meal will produce gas. Avoid these foods:  vegetables, fried / greasy foods, carbonated drinks    ACTIVITY  You may resume your normal daily activities   Spend the remainder of the day resting -  avoid any strenuous activity. CALL M.D. ANY SIGN OF   Increasing pain, nausea, vomiting  Abdominal distension (swelling)  New increased bleeding (oral or rectal)  Fever (chills)  Pain in chest area  Bloody discharge from nose or mouth  Shortness of breath    Follow-up Instructions:   Call Dr. Martha Michaud for any questions or problems, and follow up in 3 months   Telephone # [phone]  Start taking daily protonix for 90 days   Will order CT scan abdomen/ pelvis , to complete the work up of your symptoms     ENDOSCOPY FINDINGS:   Your endoscopy showed gastritis ( inflammation in stomach ), biopsies taken, rest of exam was normal .    Signed By: Martha Michaud MD     7/17/2023  8:40 AM         Learning About Coronavirus (COVID-19)  Coronavirus (COVID-19): Overview  What is coronavirus ([id number])? The coronavirus disease (COVID-19) is caused by a virus. It is an illness that was first found in [place], [place], in December 2019. It has since spread worldwide.   The virus can cause fever,

## 2023-07-17 NOTE — PROGRESS NOTES
Pt taking and tolerating PO. Pt cleared and appropriate for discharge. Pt is awake and alert and VS are at baseline. Responsible  contacted for discharge. All discharge instructions , RX , and all questions have been reviewed with understanding. Paper instructions provided.

## 2023-07-17 NOTE — OP NOTE
[facility]  [address], [address] [facility]        Esophago- Gastroduodenoscopy (EGD) Procedure Note    Ling Beltran  1945  [id number]      Procedure: Endoscopic Gastroduodenoscopy with biopsy    Indication: epigastric pain, melena, and weight loss     Pre-operative Diagnosis: see indication above    Post-operative Diagnosis: see findings below    : Zain Jackson MD    Surgical Assistant: Circulator: Ashli Cole RN  Endoscopy Technician: Darron Torres    Implants:  None    Referring Provider:  Keyana Treadwell MD      Anesthesia/Sedation:  MAC anesthesia Propofol        Procedure Details     After infomed consent was obtained for the procedure, with all risks and benefits of procedure explained the patient was taken to the endoscopy suite and placed in the left lateral decubitus position. Following sequential administration of sedation as per above, the endoscope was inserted into the mouth and advanced under direct vision to third portion of the duodenum. A careful inspection was made as the gastroscope was withdrawn, including a retroflexed view of the proximal stomach; findings and interventions are described below. Findings:   Esophagus:normal  Stomach: gastritis in antrum, biopsies taken  Rest of stomach was normal   Duodenum: normal, random biopsies taken       Therapies:  none    Specimens:  as above          EBL: None      Complications:   None; patient tolerated the procedure well. Impression:    -See post-procedure diagnoses.     Recommendations:  -Follow up with me in 3 to 4 months   - start her on protonix 40 mg /d for 90 days  -will order CT scan abdomen and pelvis to complete the GI work up of her symptoms     Signed By: Zain Jackson MD     7/17/2023  8:35 AM

## 2023-07-17 NOTE — H&P
[facility]  [address], [address] [facility]      History and Physical       NAME:  Vinicio Bray   :   1945   MRN:   [id number]             History of Present Illness:  Patient is a 69 y.o. who is seen for epigastric pain, weight loss, melena . PMH:  Past Medical History:   Diagnosis Date    Colon polyps     Esophageal spasm     Menopause     LMP-Unknown    Migraine     Osteopenia     Vertigo        PSH:  Past Surgical History:   Procedure Laterality Date    COLONOSCOPY N/A 8/15/2022    COLONOSCOPY performed by Claudia Alonzo MD at [facility] ENDOSCOPY    COLONOSCOPY N/A 2019    COLONOSCOPY performed by Claudia Alonzo MD at [facility] ENDOSCOPY    COLONOSCOPY      negative;  one    ORTHOPEDIC SURGERY Right -    rotator cuff repair/ reattached biceps with screw    ORTHOPEDIC SURGERY Right     R wrist surgery after carpal fractures     ORTHOPEDIC SURGERY Right     r knee ACL, removal of loose body       Allergies: Allergies   Allergen Reactions    Cyclobenzaprine Dizziness or Vertigo    Fish-Derived Products Diarrhea    Garlic Diarrhea    Other Other (See Comments)     Reaction Type: Allergy; Reaction(s): Poison Ivy    Oxycodone-Acetaminophen Other (See Comments)     Headache - severe    Penicillins Hives    Propoxyphene Other (See Comments)     headache    Shellfish Allergy Diarrhea    Tomato Diarrhea    Nonoxynol 9 Rash     Severe allergy       Home Medications:  Prior to Admission Medications   Prescriptions Last Dose Informant Patient Reported? Taking? Calcium Carbonate-Vitamin D 600-3. 125 MG-MCG TABS Past Week  Yes No   Sig: Take 1 tablet by mouth daily   Multiple Vitamins-Minerals (MULTIVITAMIN ADULTS PO) Past Week  Yes No   Sig: Take 1 tablet by mouth daily   OMEGA-3 FATTY ACIDS PO Past Week  Yes No   Sig: Take 1 capsule by mouth daily   acetaminophen (TYLENOL) 325 MG tablet 2023  Yes Yes   Sig: Take 2 tablets by mouth every 6 hours as

## 2023-08-03 ENCOUNTER — HOSPITAL ENCOUNTER (OUTPATIENT)
Facility: HOSPITAL | Age: 78
Discharge: HOME | End: 2023-08-03
Attending: INTERNAL MEDICINE
Payer: MEDICARE

## 2023-08-03 DIAGNOSIS — R10.84 GENERALIZED ABDOMINAL PAIN: ICD-10-CM

## 2023-08-03 DIAGNOSIS — R63.4 WEIGHT LOSS: ICD-10-CM

## 2023-08-03 DIAGNOSIS — R10.9 ABDOMINAL PAIN, UNSPECIFIED ABDOMINAL LOCATION: ICD-10-CM

## 2023-08-03 LAB — CREAT BLD-MCNC: 0.6 MG/DL (ref 0.6–1.3)

## 2023-08-03 PROCEDURE — 82565 ASSAY OF CREATININE: CPT

## 2023-08-03 PROCEDURE — 74178 CT ABD&PLV WO CNTR FLWD CNTR: CPT

## 2023-08-03 PROCEDURE — 6360000004 HC RX CONTRAST MEDICATION: Performed by: INTERNAL MEDICINE

## 2023-08-03 RX ADMIN — IOPAMIDOL 100 ML: 755 INJECTION, SOLUTION INTRAVENOUS at 10:11

## 2023-08-15 ENCOUNTER — TELEPHONE (OUTPATIENT)
Age: 78
End: 2023-08-15

## 2023-08-15 RX ORDER — CIPROFLOXACIN HYDROCHLORIDE 3.5 MG/ML
SOLUTION/ DROPS TOPICAL
Qty: 5 ML | Refills: 0 | Status: SHIPPED | OUTPATIENT
Start: 2023-08-15

## 2023-08-15 NOTE — TELEPHONE ENCOUNTER
Pt's  is calling for his wife, she has red eye and would like for medication to be sent to the pharmacy for her. He would also like a call back as soon as possible.

## 2023-08-21 RX ORDER — CIPROFLOXACIN HYDROCHLORIDE 3.5 MG/ML
SOLUTION/ DROPS TOPICAL
Qty: 5 ML | Refills: 0 | Status: SHIPPED | OUTPATIENT
Start: 2023-08-21

## 2024-10-30 ENCOUNTER — TELEPHONE (OUTPATIENT)
Age: 79
End: 2024-10-30

## 2024-11-20 ENCOUNTER — PROCEDURE VISIT (OUTPATIENT)
Age: 79
End: 2024-11-20
Payer: MEDICARE

## 2024-11-20 DIAGNOSIS — R41.0 CONFUSION: Primary | ICD-10-CM

## 2024-11-20 PROCEDURE — 95819 EEG AWAKE AND ASLEEP: CPT | Performed by: PSYCHIATRY & NEUROLOGY

## 2024-11-26 NOTE — PROGRESS NOTES
[facility]   Electroencephalogram Report    Procedure ID: [id number] Procedure Date: 11/20/2024   Patient Name: Haydee Charles YOB: 1945   Procedure Type: Routine Medical Record No: [id number]       An EEG is requested in this 79-year-old lady to evaluate for epileptiform abnormality.  Medication said to include Razadyne    This tracing is obtained during the awake, drowsy, and sleeping states.    During wakefulness the background consists of diffuse mixed frequency theta range activity.    Hyperventilation is not performed.    Intermittent photic stimulation little alters the tracing.    Stage II sleep is attained.    Interpretation  This EEG recorded during the awake, drowsy, and sleeping states is abnormal secondary to diffuse slowing and disorganization of the background rhythms in keeping with a moderate degree of bihemispheric dysfunction as is commonly seen with an encephalopathy nonspecific as to cause.  This pattern is also chronically seen in neurodegenerative disorder such as dementia.        Shilpa Ling MD

## 2024-12-02 ENCOUNTER — TELEPHONE (OUTPATIENT)
Age: 79
End: 2024-12-02

## 2024-12-02 NOTE — TELEPHONE ENCOUNTER
Please fax the results of the patient's EMG to [facility]    Their fax number is [phone].

## (undated) DEVICE — SET ADMIN 16ML TBNG L100IN 2 Y INJ SITE IV PIGGY BK DISP

## (undated) DEVICE — Z DISCONTINUED USE 2751540 TUBING IRRIG L10IN DISP PMP ENDOGATOR

## (undated) DEVICE — TUBING HYDR IRR --

## (undated) DEVICE — SYRINGE MED 20ML STD CLR PLAS LUERLOCK TIP N CTRL DISP

## (undated) DEVICE — KENDALL RADIOLUCENT FOAM MONITORING ELECTRODE -RECTANGULAR SHAPE: Brand: KENDALL

## (undated) DEVICE — Device

## (undated) DEVICE — Z DUP USE 2149365 FORCEPS BX L240CM JAW DIA2.8MM L CAP W/ NDL MIC MESH TOOTH

## (undated) DEVICE — AIRLIFE™ U/CONNECT-IT OXYGEN TUBING 7 FEET (2.1 M) CRUSH-RESISTANT OXYGEN TUBING, VINYL TIPPED: Brand: AIRLIFE™

## (undated) DEVICE — 1200 GUARD II KIT W/5MM TUBE W/O VAC TUBE: Brand: GUARDIAN

## (undated) DEVICE — Device: Brand: MEDICAL ACTION INDUSTRIES

## (undated) DEVICE — UNDERPAD XTR STRGHT 30X36IN --

## (undated) DEVICE — Z DISCONTINUED NO SUB IDED SET EXTN W/ 4 W STPCOCK M SPIN LOK 36IN

## (undated) DEVICE — ENDO CARRY-ON PROCEDURE KIT INCLUDES ENZYMATIC SPONGE, GAUZE, BIOHAZARD LABEL, TRAY, LUBRICANT, DIRTY SCOPE LABEL, WATER LABEL, TRAY, DRAWSTRING PAD, AND DEFENDO 4-PIECE KIT.: Brand: ENDO CARRY-ON PROCEDURE KIT

## (undated) DEVICE — BAG BELONG PT PERS CLEAR HANDL

## (undated) DEVICE — ESOPHAGEAL BALLOON DILATATION CATHETER: Brand: CRE FIXED WIRE

## (undated) DEVICE — CATH IV AUTOGRD BC BLU 22GA 25 -- INSYTE

## (undated) DEVICE — NEONATAL-ADULT SPO2 SENSOR: Brand: NELLCOR

## (undated) DEVICE — NEEDLE HYPO 18GA L1.5IN PNK S STL HUB POLYPR SHLD REG BVL

## (undated) DEVICE — CONNECTOR TBNG AUX H2O JET DISP FOR OLY 160/180 SER

## (undated) DEVICE — SOLIDIFIER FLUID 3000 CC ABSORB

## (undated) DEVICE — BW-412T DISP COMBO CLEANING BRUSH: Brand: SINGLE USE COMBINATION CLEANING BRUSH

## (undated) DEVICE — FORCEPS BX L240CM JAW DIA2.8MM L CAP W/ NDL MIC MESH TOOTH

## (undated) DEVICE — FCPS RAD JAW 4LC 240CM W/NDL -- BX/20 RADIAL JAW 4

## (undated) DEVICE — QUILTED PREMIUM COMFORT UNDERPAD,EXTRA HEAVY: Brand: WINGS